# Patient Record
Sex: MALE | Race: WHITE | NOT HISPANIC OR LATINO
[De-identification: names, ages, dates, MRNs, and addresses within clinical notes are randomized per-mention and may not be internally consistent; named-entity substitution may affect disease eponyms.]

---

## 2021-03-11 PROBLEM — Z00.00 ENCOUNTER FOR PREVENTIVE HEALTH EXAMINATION: Status: ACTIVE | Noted: 2021-03-11

## 2021-03-15 ENCOUNTER — NON-APPOINTMENT (OUTPATIENT)
Age: 66
End: 2021-03-15

## 2021-03-15 ENCOUNTER — APPOINTMENT (OUTPATIENT)
Dept: CARDIOLOGY | Facility: CLINIC | Age: 66
End: 2021-03-15
Payer: COMMERCIAL

## 2021-03-15 VITALS
HEIGHT: 72 IN | TEMPERATURE: 97.6 F | WEIGHT: 173 LBS | BODY MASS INDEX: 23.43 KG/M2 | HEART RATE: 73 BPM | DIASTOLIC BLOOD PRESSURE: 78 MMHG | SYSTOLIC BLOOD PRESSURE: 130 MMHG | OXYGEN SATURATION: 100 %

## 2021-03-15 PROCEDURE — 93000 ELECTROCARDIOGRAM COMPLETE: CPT | Mod: 59

## 2021-03-15 PROCEDURE — 99072 ADDL SUPL MATRL&STAF TM PHE: CPT

## 2021-03-15 PROCEDURE — 99203 OFFICE O/P NEW LOW 30 MIN: CPT

## 2021-03-15 PROCEDURE — 93242 EXT ECG>48HR<7D RECORDING: CPT

## 2021-03-15 NOTE — REASON FOR VISIT
[Consultation] : a consultation regarding [FreeTextEntry2] : brief palpitations, chest pain, dyspnea at rest [FreeTextEntry1] : Delma is a 65-year-old male with history of hypertension on lisinopril HCTZ BPH on Tamsulosin, GERD, vertigo, family history of valvular heart disease.\par \par No history of CAD, MI, revascularization, VHD, CHF, TIA, CVA, diabetes, PVD, DVT, PE, arrhythmia, AF.\par \par Patient has recurrent resting brief palpitations, fluttering with associated chest pressure, mild intensity nonradiating nonreproducible.  Palpitations and chest discomfort occur once or twice per week.  Cardiovascular review of symptoms is negative for exertional chest pain, dyspnea, dizziness or syncope.  No PND or orthopnea leg edema.  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 Ward who has retired, last seen 2013 normal Myoview stress test, echocardiogram and Holter monitor at that time. \par \par Labs Oct 2020 normal CBC, BMP, LFT, TSH, HbA1c 5.9 fasting cholesterol 189, triglyceride 50, HDL 64, \par

## 2021-03-15 NOTE — ASSESSMENT
[FreeTextEntry1] : Delma is a 65-year-old male with medical history detailed above and active medical issues including:\par \par - Recurrent palpitations associated chest pressure and dyspnea, abnormal baseline EKG. patient will have noninvasive testing with exercise stress echo to assess for obstructive CAD, HR and BP response, exercise-induced arrhythmia, echocardiogram for LVEF, structural heart disease, carotid and abdominal ultrasound to assess for obstructive PAD.  Zio patch 1 week heart monitor started today. \par \par - Hypertension to new to monitor home BPs to confirm at guideline goal on lisinopril 20 mg and HCTZ 12.5 mg daily\par \par - Family history of valvular heart disease other had 3 AVR surgeries\par \par - BPH on Tamsulosin\par \par Patient will be seen in cardiology follow-up after noninvasive testing.\par

## 2021-03-15 NOTE — PHYSICAL EXAM
[General Appearance - Well Developed] : well developed [Normal Appearance] : normal appearance [Well Groomed] : well groomed [General Appearance - Well Nourished] : well nourished [No Deformities] : no deformities [General Appearance - In No Acute Distress] : no acute distress [Normal Conjunctiva] : the conjunctiva exhibited no abnormalities [Eyelids - No Xanthelasma] : the eyelids demonstrated no xanthelasmas [Normal Oral Mucosa] : normal oral mucosa [No Oral Pallor] : no oral pallor [No Oral Cyanosis] : no oral cyanosis [Normal Jugular Venous A Waves Present] : normal jugular venous A waves present [Normal Jugular Venous V Waves Present] : normal jugular venous V waves present [No Jugular Venous Ramírez A Waves] : no jugular venous ramírez A waves [Heart Rate And Rhythm] : heart rate and rhythm were normal [Heart Sounds] : normal S1 and S2 [Murmurs] : no murmurs present [Respiration, Rhythm And Depth] : normal respiratory rhythm and effort [Exaggerated Use Of Accessory Muscles For Inspiration] : no accessory muscle use [Auscultation Breath Sounds / Voice Sounds] : lungs were clear to auscultation bilaterally [Abdomen Soft] : soft [Abdomen Tenderness] : non-tender [Abdomen Mass (___ Cm)] : no abdominal mass palpated [Abnormal Walk] : normal gait [Gait - Sufficient For Exercise Testing] : the gait was sufficient for exercise testing [Nail Clubbing] : no clubbing of the fingernails [Cyanosis, Localized] : no localized cyanosis [Petechial Hemorrhages (___cm)] : no petechial hemorrhages [Skin Color & Pigmentation] : normal skin color and pigmentation [] : no rash [No Venous Stasis] : no venous stasis [Skin Lesions] : no skin lesions [No Skin Ulcers] : no skin ulcer [No Xanthoma] : no  xanthoma was observed [Oriented To Time, Place, And Person] : oriented to person, place, and time [Affect] : the affect was normal [Mood] : the mood was normal [No Anxiety] : not feeling anxious

## 2021-03-15 NOTE — REVIEW OF SYSTEMS
[Shortness Of Breath] : shortness of breath [Dyspnea on exertion] : dyspnea during exertion [Chest  Pressure] : chest pressure [Palpitations] : palpitations [Negative] : Heme/Lymph

## 2021-03-16 ENCOUNTER — TRANSCRIPTION ENCOUNTER (OUTPATIENT)
Age: 66
End: 2021-03-16

## 2021-03-30 ENCOUNTER — APPOINTMENT (OUTPATIENT)
Dept: CARDIOLOGY | Facility: CLINIC | Age: 66
End: 2021-03-30
Payer: COMMERCIAL

## 2021-03-30 PROCEDURE — 93979 VASCULAR STUDY: CPT

## 2021-03-30 PROCEDURE — 99072 ADDL SUPL MATRL&STAF TM PHE: CPT

## 2021-03-30 PROCEDURE — 93306 TTE W/DOPPLER COMPLETE: CPT

## 2021-03-30 PROCEDURE — 93880 EXTRACRANIAL BILAT STUDY: CPT

## 2021-04-06 PROCEDURE — 99072 ADDL SUPL MATRL&STAF TM PHE: CPT

## 2021-04-06 PROCEDURE — 93244 EXT ECG>48HR<7D REV&INTERPJ: CPT

## 2021-05-05 ENCOUNTER — APPOINTMENT (OUTPATIENT)
Dept: CARDIOLOGY | Facility: CLINIC | Age: 66
End: 2021-05-05
Payer: MEDICARE

## 2021-05-05 PROCEDURE — 93351 STRESS TTE COMPLETE: CPT

## 2021-05-10 ENCOUNTER — APPOINTMENT (OUTPATIENT)
Dept: CARDIOLOGY | Facility: CLINIC | Age: 66
End: 2021-05-10
Payer: MEDICARE

## 2021-05-10 VITALS
RESPIRATION RATE: 16 BRPM | WEIGHT: 169 LBS | HEART RATE: 81 BPM | DIASTOLIC BLOOD PRESSURE: 78 MMHG | HEIGHT: 72 IN | SYSTOLIC BLOOD PRESSURE: 148 MMHG | TEMPERATURE: 98.2 F | BODY MASS INDEX: 22.89 KG/M2 | OXYGEN SATURATION: 98 %

## 2021-05-10 PROCEDURE — 99214 OFFICE O/P EST MOD 30 MIN: CPT

## 2021-05-10 RX ORDER — MV-MIN/FOLIC/VIT K/LYCOP/COQ10 200-100MCG
CAPSULE ORAL
Refills: 0 | Status: ACTIVE | COMMUNITY

## 2021-05-10 RX ORDER — TAMSULOSIN HYDROCHLORIDE 0.4 MG/1
0.4 CAPSULE ORAL
Qty: 90 | Refills: 1 | Status: ACTIVE | COMMUNITY
Start: 2020-12-16

## 2021-05-10 NOTE — REVIEW OF SYSTEMS
[Chest Discomfort] : chest discomfort [Palpitations] : palpitations [Dizziness] : dizziness [Negative] : Heme/Lymph

## 2021-05-25 ENCOUNTER — APPOINTMENT (OUTPATIENT)
Dept: CT IMAGING | Facility: CLINIC | Age: 66
End: 2021-05-25
Payer: MEDICARE

## 2021-05-25 PROCEDURE — 82565A: CUSTOM | Mod: QW

## 2021-05-25 PROCEDURE — 74160 CT ABDOMEN W/CONTRAST: CPT | Mod: MH

## 2021-06-01 ENCOUNTER — APPOINTMENT (OUTPATIENT)
Dept: NUCLEAR MEDICINE | Facility: CLINIC | Age: 66
End: 2021-06-01
Payer: MEDICARE

## 2021-06-01 ENCOUNTER — OUTPATIENT (OUTPATIENT)
Dept: OUTPATIENT SERVICES | Facility: HOSPITAL | Age: 66
LOS: 1 days | End: 2021-06-01

## 2021-06-01 PROCEDURE — 78306 BONE IMAGING WHOLE BODY: CPT | Mod: 26

## 2021-06-01 PROCEDURE — 78830 RP LOCLZJ TUM SPECT W/CT 1: CPT | Mod: 26

## 2021-06-14 ENCOUNTER — APPOINTMENT (OUTPATIENT)
Dept: MRI IMAGING | Facility: CLINIC | Age: 66
End: 2021-06-14
Payer: MEDICARE

## 2021-06-14 PROCEDURE — 72148 MRI LUMBAR SPINE W/O DYE: CPT | Mod: MH

## 2021-06-14 PROCEDURE — A9585: CPT

## 2021-06-15 ENCOUNTER — APPOINTMENT (OUTPATIENT)
Dept: OPHTHALMOLOGY | Facility: CLINIC | Age: 66
End: 2021-06-15

## 2021-09-09 ENCOUNTER — APPOINTMENT (OUTPATIENT)
Dept: OPHTHALMOLOGY | Facility: CLINIC | Age: 66
End: 2021-09-09
Payer: MEDICARE

## 2021-09-09 ENCOUNTER — NON-APPOINTMENT (OUTPATIENT)
Age: 66
End: 2021-09-09

## 2021-09-09 PROCEDURE — 92015 DETERMINE REFRACTIVE STATE: CPT

## 2021-10-14 PROCEDURE — 71045 X-RAY EXAM CHEST 1 VIEW: CPT | Mod: 26

## 2021-10-14 PROCEDURE — 93010 ELECTROCARDIOGRAM REPORT: CPT

## 2021-10-14 PROCEDURE — 99291 CRITICAL CARE FIRST HOUR: CPT

## 2021-10-15 ENCOUNTER — INPATIENT (INPATIENT)
Facility: HOSPITAL | Age: 66
LOS: 2 days | Discharge: HOME | End: 2021-10-18
Payer: COMMERCIAL

## 2021-10-15 PROCEDURE — 70450 CT HEAD/BRAIN W/O DYE: CPT | Mod: 26

## 2021-10-29 ENCOUNTER — APPOINTMENT (OUTPATIENT)
Dept: OPHTHALMOLOGY | Facility: CLINIC | Age: 66
End: 2021-10-29
Payer: MEDICARE

## 2021-10-29 ENCOUNTER — NON-APPOINTMENT (OUTPATIENT)
Age: 66
End: 2021-10-29

## 2021-10-29 PROCEDURE — 92083 EXTENDED VISUAL FIELD XM: CPT

## 2021-10-29 PROCEDURE — 92250 FUNDUS PHOTOGRAPHY W/I&R: CPT

## 2021-11-11 ENCOUNTER — NON-APPOINTMENT (OUTPATIENT)
Age: 66
End: 2021-11-11

## 2021-11-11 ENCOUNTER — APPOINTMENT (OUTPATIENT)
Dept: OPHTHALMOLOGY | Facility: CLINIC | Age: 66
End: 2021-11-11
Payer: MEDICARE

## 2021-11-11 PROCEDURE — 92133 CPTRZD OPH DX IMG PST SGM ON: CPT

## 2021-11-11 PROCEDURE — 92014 COMPRE OPH EXAM EST PT 1/>: CPT

## 2021-11-30 ENCOUNTER — APPOINTMENT (OUTPATIENT)
Dept: CARDIOLOGY | Facility: CLINIC | Age: 66
End: 2021-11-30
Payer: MEDICARE

## 2021-11-30 VITALS — HEART RATE: 64 BPM | SYSTOLIC BLOOD PRESSURE: 122 MMHG | DIASTOLIC BLOOD PRESSURE: 60 MMHG | OXYGEN SATURATION: 100 %

## 2021-11-30 PROCEDURE — 99214 OFFICE O/P EST MOD 30 MIN: CPT

## 2021-11-30 RX ORDER — SIMETHICONE 80 MG
80 TABLET ORAL 4 TIMES DAILY
Refills: 0 | Status: DISCONTINUED | COMMUNITY
End: 2021-11-30

## 2021-12-02 NOTE — PHYSICAL EXAM
[Well Developed] : well developed [Well Nourished] : well nourished [No Acute Distress] : no acute distress [Normal Venous Pressure] : normal venous pressure [No Carotid Bruit] : no carotid bruit [Normal S1, S2] : normal S1, S2 [No Rub] : no rub [No Gallop] : no gallop [Murmur] : murmur [Clear Lung Fields] : clear lung fields [Good Air Entry] : good air entry [No Respiratory Distress] : no respiratory distress  [Soft] : abdomen soft [Non Tender] : non-tender [No Masses/organomegaly] : no masses/organomegaly [Normal Bowel Sounds] : normal bowel sounds [Normal Gait] : normal gait [No Edema] : no edema [No Cyanosis] : no cyanosis [No Clubbing] : no clubbing [No Varicosities] : no varicosities [No Rash] : no rash [No Skin Lesions] : no skin lesions [Moves all extremities] : moves all extremities [No Focal Deficits] : no focal deficits [Normal Speech] : normal speech [Alert and Oriented] : alert and oriented [Normal memory] : normal memory [General Appearance - Well Developed] : well developed [Normal Appearance] : normal appearance [Well Groomed] : well groomed [General Appearance - Well Nourished] : well nourished [No Deformities] : no deformities [General Appearance - In No Acute Distress] : no acute distress [Normal Conjunctiva] : the conjunctiva exhibited no abnormalities [Eyelids - No Xanthelasma] : the eyelids demonstrated no xanthelasmas [Normal Oral Mucosa] : normal oral mucosa [No Oral Pallor] : no oral pallor [No Oral Cyanosis] : no oral cyanosis [Normal Jugular Venous A Waves Present] : normal jugular venous A waves present [Normal Jugular Venous V Waves Present] : normal jugular venous V waves present [No Jugular Venous Ramírez A Waves] : no jugular venous ramírez A waves [Heart Rate And Rhythm] : heart rate and rhythm were normal [Heart Sounds] : normal S1 and S2 [Murmurs] : no murmurs present [Respiration, Rhythm And Depth] : normal respiratory rhythm and effort [Exaggerated Use Of Accessory Muscles For Inspiration] : no accessory muscle use [Auscultation Breath Sounds / Voice Sounds] : lungs were clear to auscultation bilaterally [Abdomen Soft] : soft [Abdomen Tenderness] : non-tender [Abdomen Mass (___ Cm)] : no abdominal mass palpated [Abnormal Walk] : normal gait [Gait - Sufficient For Exercise Testing] : the gait was sufficient for exercise testing [Nail Clubbing] : no clubbing of the fingernails [Cyanosis, Localized] : no localized cyanosis [Petechial Hemorrhages (___cm)] : no petechial hemorrhages [Skin Color & Pigmentation] : normal skin color and pigmentation [] : no rash [No Venous Stasis] : no venous stasis [Skin Lesions] : no skin lesions [No Skin Ulcers] : no skin ulcer [No Xanthoma] : no  xanthoma was observed [Oriented To Time, Place, And Person] : oriented to person, place, and time [Affect] : the affect was normal [Mood] : the mood was normal [No Anxiety] : not feeling anxious [de-identified] : 2/6 [facility] LSB of MR

## 2021-12-02 NOTE — REASON FOR VISIT
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 CTA was ordered and never completed due to scheduling issues. Since last seen he received the Covid booster 10/2021 and experiences vomiting and decreased PO intake for 3 days. Was drinking water. Went to the ER and sodium was found to be 106. BP was elevated. His HCTZ was stopped. BP remained elevated and he was discharged on Amlodipine 5 mg daily and Lisinopril was increased to 20mg BID. Followed up with PCP and renal and then Toprol XL 25mg daily and HCTZ 12.5mg daily was started. Most recent Na was 137 2 weeks ago per patient report. S/he denies chest pain, pressure, palpitations, unusual shortness of breath, orthopnea, LE edema, or syncope. Reports lightheadedness with changes in position that sound vertigo-like in nature. Never a smoker. Attempting to exercise without exertional complaints; likes to ride his bike weather permitting.\par \[facility] Home BP log reviewed. BPs stable. /70 on my exam today.\par \[facility] records requested. Most recent note from renal; Dr. Olivas requested.\par \par Testing:\par \par Exercise stress echo May 2021, ischemic EKG response with normal exercise wall motion, leg fatigue with peak exercise, 89% MPHR, 7 minutes 45 seconds of Jose Roberto protocol, baseline sinus rhythm LAE, IRBBB, NSST\par \par Echocardiogram March 30, 21, Mild to mod MR, minimal AR, Noraml wall motion, mild TR, minimal MI, small pericardial effusion.\par \par Labs 3/15/21: WBC 5.2, Hgb 15, HCT 43.6, plt 149, Cr 1.03, Na 136, K 4.4, Ca 9.1, AST 18, ALT 14, Chol 189, Trigs 50, HDL 64, , A1C 5.9, TSH 1.35, CK 59\par \par Carotid and abdominal ultrasound March 2021, mild nonobstructive plaque, normal abdominal aortic size.\par \par Zio patch 1 week heart monitor March 2021 sinus rhythm average heart rate 62 bpm, brief PSVT, PACs, PVCs, ventricular bigeminy and trigeminy\par \par EKG sinus bradycardia, NAVYA, anterior TWI\par \par Patient followed by cardiologist Dr. Ward who has retired, last seen 2013 normal Myoview stress test, echocardiogram and Holter monitor at that time. \par \par Labs Oct 2020 normal CBC, BMP, LFT, TSH, HbA1c 5.9 fasting cholesterol 189, triglyceride 50, HDL 64, \par

## 2021-12-02 NOTE — ASSESSMENT
[FreeTextEntry1] : MIRIAM AVILA is a 66 year old M who presents today Nov 30, 2021 with the above history and the following active issues:\par \par \par Patient reminded to get CTA. He declines. Also asymptomatic.\par \par HTN: Continue Amlodipine 5mg daily, HCTZ 12.5 mg daily, Lisinopril 20mg BID, and Toprol Xl 25mg daily. Educated patient on low salt diet, alcohol intake in moderation, regular cardiovascular exercise, and weight reduction for improved BP control. Continue to monitor BP at home and call for persistently elevated readings (>160/90). \par \par \par Small pericardial effusion noted on echo 3/2021. Recommend repeating limited echo and OV with Dr. Valentino same day when he gets back from [place].\par Family history of valvular heart disease other had 3 AVR surgeries\par \par BPH on Tamsulosin\par \par Ongoing f/u with PCP.\par \par F/U: Limited echo and OV in 3 months with Dr. Valentino.\par Discussed red flag symptoms, which would warrant sooner or emergent medical evaluation.\par Any questions and concerns were addressed and resolved.\par \par Sincerely,\par Soumya Bravo FNP-BC\par Patient's history, testing, and plan was reviewed with supervising physician, Dr. Xavier Guerrero

## 2021-12-16 ENCOUNTER — NON-APPOINTMENT (OUTPATIENT)
Age: 66
End: 2021-12-16

## 2021-12-23 NOTE — REASON FOR VISIT
[Consultation] : a consultation regarding [FreeTextEntry1] : Delma is a 66-year-old male with history of hypertension on lisinopril HCTZ BPH on Tamsulosin, GERD, vertigo, family history of valvular heart disease.\par \par No history of CAD, MI, revascularization, VHD, CHF, TIA, CVA, diabetes, PVD, DVT, PE, arrhythmia, AF.\par \par Patient has brief palpitations, fluttering with associated chest pressure, mild intensity nonradiating nonreproducible.  Palpitations and chest discomfort occur once or twice per week.  Cardiovascular review of symptoms is negative for exertional chest pain, dyspnea, dizziness or syncope.  No PND or orthopnea leg edema.  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 his partner\par \par Exercise stress echo May 2021, ischemic EKG response with normal exercise wall motion, leg fatigue with peak exercise, 89% MPHR, 7 minutes 45 seconds of Jose Roberto protocol, baseline sinus rhythm LAE, IRBBB, NSST\par \par Echocardiogram March 2021, LVEF 60%, mild to moderate MR, mild TR, normal RVSP.\par \par Carotid and abdominal ultrasound March 2021, mild nonobstructive plaque, normal abdominal aortic size.\par \par Zio patch 1 week heart monitor March 2021 sinus rhythm average heart rate 62 bpm, brief PSVT, PACs, PVCs, ventricular bigeminy and trigeminy\par \par EKG sinus bradycardia, NAVYA, anterior TWI\par \par Patient followed by cardiologist Dr. Ward who has retired, last seen 2013 normal Myoview stress test, echocardiogram and Holter monitor at that time. \par \par Labs Oct 2020 normal CBC, BMP, LFT, TSH, HbA1c 5.9 fasting cholesterol 189, triglyceride 50, HDL 64, \par  [FreeTextEntry2] : noninvasive testing for brief palpitations, chest pain, dyspnea at rest

## 2021-12-23 NOTE — ASSESSMENT
[FreeTextEntry1] : Delma is a 66-year-old male with medical history detailed above and active medical issues including:\par \par - Recurrent palpitations associated chest pressure and dyspnea, abnormal baseline EKG, ischemic EKG response with normal exercise wall motion on stress test May 2021. Patient will have noninvasive testing with coronary CTA to assess for obstructive CAD with TEB followup.  Recommended increased oral hydration with electrolyte suppliment drinks, avoid caffeine and alcohol intake.\par \par - Hypertension to new to monitor home BPs to confirm at guideline goal on lisinopril 20 mg and HCTZ 12.5 mg daily\par \par - Family history of valvular heart disease other had 3 AVR surgeries\par \par - BPH on Tamsulosin, nephrologist Dr. Chaidez\par \par Patient will be seen in cardiology follow-up 1 year at patient request. \par \par Delma will follow up with Dr Herman Mcginnis for primary care\par

## 2021-12-23 NOTE — PHYSICAL EXAM
[General Appearance - Well Developed] : well developed [Normal Appearance] : normal appearance [Well Groomed] : well groomed [General Appearance - Well Nourished] : well nourished [No Deformities] : no deformities [General Appearance - In No Acute Distress] : no acute distress [Eyelids - No Xanthelasma] : the eyelids demonstrated no xanthelasmas [Normal Oral Mucosa] : normal oral mucosa [No Oral Pallor] : no oral pallor [No Oral Cyanosis] : no oral cyanosis [Normal Jugular Venous A Waves Present] : normal jugular venous A waves present [Normal Jugular Venous V Waves Present] : normal jugular venous V waves present [No Jugular Venous Ramírez A Waves] : no jugular venous ramírez A waves [Heart Rate And Rhythm] : heart rate and rhythm were normal [Heart Sounds] : normal S1 and S2 [Murmurs] : no murmurs present [Respiration, Rhythm And Depth] : normal respiratory rhythm and effort [Auscultation Breath Sounds / Voice Sounds] : lungs were clear to auscultation bilaterally [Exaggerated Use Of Accessory Muscles For Inspiration] : no accessory muscle use [Abdomen Soft] : soft [Abdomen Tenderness] : non-tender [Abdomen Mass (___ Cm)] : no abdominal mass palpated [Abnormal Walk] : normal gait [Gait - Sufficient For Exercise Testing] : the gait was sufficient for exercise testing [Nail Clubbing] : no clubbing of the fingernails [Cyanosis, Localized] : no localized cyanosis [Petechial Hemorrhages (___cm)] : no petechial hemorrhages [Skin Color & Pigmentation] : normal skin color and pigmentation [] : no rash [No Venous Stasis] : no venous stasis [Skin Lesions] : no skin lesions [No Skin Ulcers] : no skin ulcer [No Xanthoma] : no  xanthoma was observed [Oriented To Time, Place, And Person] : oriented to person, place, and time [Affect] : the affect was normal [Mood] : the mood was normal [No Anxiety] : not feeling anxious [Well Developed] : well developed [Well Nourished] : well nourished [No Acute Distress] : no acute distress [Normal Conjunctiva] : normal conjunctiva [Normal Venous Pressure] : normal venous pressure [No Carotid Bruit] : no carotid bruit [Normal S1, S2] : normal S1, S2 [No Rub] : no rub [No Gallop] : no gallop [Murmur] : murmur [Clear Lung Fields] : clear lung fields [Good Air Entry] : good air entry [No Respiratory Distress] : no respiratory distress  [Soft] : abdomen soft [Non Tender] : non-tender [No Masses/organomegaly] : no masses/organomegaly [Normal Bowel Sounds] : normal bowel sounds [Normal Gait] : normal gait [No Edema] : no edema [No Cyanosis] : no cyanosis [No Clubbing] : no clubbing [No Varicosities] : no varicosities [No Rash] : no rash [No Skin Lesions] : no skin lesions [Moves all extremities] : moves all extremities [No Focal Deficits] : no focal deficits [Normal Speech] : normal speech [Alert and Oriented] : alert and oriented [Normal memory] : normal memory [de-identified] : 2/6 [facility] LSB of MR

## 2022-01-12 RX ORDER — METOPROLOL SUCCINATE 25 MG/1
25 TABLET, EXTENDED RELEASE ORAL DAILY
Refills: 0 | Status: DISCONTINUED | COMMUNITY
Start: 2021-11-30 | End: 2022-01-12

## 2022-03-21 ENCOUNTER — APPOINTMENT (OUTPATIENT)
Dept: CARDIOLOGY | Facility: CLINIC | Age: 67
End: 2022-03-21
Payer: MEDICARE

## 2022-03-21 PROCEDURE — 93308 TTE F-UP OR LMTD: CPT

## 2022-03-25 ENCOUNTER — NON-APPOINTMENT (OUTPATIENT)
Age: 67
End: 2022-03-25

## 2022-03-25 ENCOUNTER — APPOINTMENT (OUTPATIENT)
Dept: OPHTHALMOLOGY | Facility: CLINIC | Age: 67
End: 2022-03-25
Payer: MEDICARE

## 2022-03-25 PROCEDURE — 92014 COMPRE OPH EXAM EST PT 1/>: CPT

## 2022-04-07 ENCOUNTER — APPOINTMENT (OUTPATIENT)
Dept: CARDIOLOGY | Facility: CLINIC | Age: 67
End: 2022-04-07
Payer: MEDICARE

## 2022-04-07 PROCEDURE — 99214 OFFICE O/P EST MOD 30 MIN: CPT

## 2022-06-08 NOTE — REASON FOR VISIT
[Consultation] : a consultation regarding [Other: ____] : [unfilled] [FreeTextEntry1] : Delma is a 66-year-old male with history of hypertension on lisinopril HCTZ BPH on Tamsulosin, GERD, vertigo, family history of valvular heart disease.\par \par No history of CAD, MI, revascularization, VHD, CHF, TIA, CVA, diabetes, PVD, DVT, PE, arrhythmia, AF.\par \par Cardiovascular review of symptoms is negative for exertional chest pain, dyspnea, palpitations, dizziness or syncope.  No PND or orthopnea leg edema.  No bleeding or black stool.\par \par Patient is exercising riding a bike more than 30 minutes without exertional symptoms other than mild leg fatigue.\par \par Patient is a retired  at [employer] living locally with his partner\par \par Echocardiogram March 2022 LVEF 60%, mild MR, trace pericardial effusion\par \par Exercise stress echo May 2021, ischemic EKG response with normal exercise wall motion, leg fatigue with peak exercise, 89% MPHR, 7 minutes 45 seconds of Jose Roberto protocol, baseline sinus rhythm LAE, IRBBB, NSST\par \par Echocardiogram March 2021, LVEF 60%, mild to moderate MR, mild TR, normal RVSP.\par \par Carotid and abdominal ultrasound March 2021, mild nonobstructive plaque, normal abdominal aortic size.\par \par Zio patch 1 week heart monitor March 2021 sinus rhythm average heart rate 62 bpm, brief PSVT, PACs, PVCs, ventricular bigeminy and trigeminy\par \par EKG sinus bradycardia, NAVYA, anterior TWI\par \par Patient followed by cardiologist Dr. Ward who has retired, last seen 2013 normal Myoview stress test, echocardiogram and Holter monitor at that time. \par \par Labs Oct 2020 normal CBC, BMP, LFT, TSH, HbA1c 5.9 fasting cholesterol 189, triglyceride 50, HDL 64, \par  [FreeTextEntry2] : noninvasive testing for brief palpitations, chest pain, dyspnea at rest

## 2022-06-08 NOTE — PHYSICAL EXAM
[Well Developed] : well developed [Well Nourished] : well nourished [No Acute Distress] : no acute distress [Normal Venous Pressure] : normal venous pressure [No Carotid Bruit] : no carotid bruit [Normal S1, S2] : normal S1, S2 [No Rub] : no rub [No Gallop] : no gallop [Murmur] : murmur [Clear Lung Fields] : clear lung fields [Good Air Entry] : good air entry [No Respiratory Distress] : no respiratory distress  [Soft] : abdomen soft [Non Tender] : non-tender [No Masses/organomegaly] : no masses/organomegaly [Normal Bowel Sounds] : normal bowel sounds [Normal Gait] : normal gait [No Edema] : no edema [No Cyanosis] : no cyanosis [No Clubbing] : no clubbing [No Varicosities] : no varicosities [No Rash] : no rash [No Skin Lesions] : no skin lesions [Moves all extremities] : moves all extremities [No Focal Deficits] : no focal deficits [Normal Speech] : normal speech [Alert and Oriented] : alert and oriented [Normal memory] : normal memory [General Appearance - Well Developed] : well developed [Normal Appearance] : normal appearance [Well Groomed] : well groomed [General Appearance - Well Nourished] : well nourished [No Deformities] : no deformities [General Appearance - In No Acute Distress] : no acute distress [Normal Conjunctiva] : the conjunctiva exhibited no abnormalities [Eyelids - No Xanthelasma] : the eyelids demonstrated no xanthelasmas [Normal Oral Mucosa] : normal oral mucosa [No Oral Pallor] : no oral pallor [No Oral Cyanosis] : no oral cyanosis [Normal Jugular Venous A Waves Present] : normal jugular venous A waves present [Normal Jugular Venous V Waves Present] : normal jugular venous V waves present [No Jugular Venous Ramírez A Waves] : no jugular venous ramírez A waves [Heart Rate And Rhythm] : heart rate and rhythm were normal [Heart Sounds] : normal S1 and S2 [Murmurs] : no murmurs present [Respiration, Rhythm And Depth] : normal respiratory rhythm and effort [Exaggerated Use Of Accessory Muscles For Inspiration] : no accessory muscle use [Auscultation Breath Sounds / Voice Sounds] : lungs were clear to auscultation bilaterally [Abdomen Soft] : soft [Abdomen Tenderness] : non-tender [Abdomen Mass (___ Cm)] : no abdominal mass palpated [Abnormal Walk] : normal gait [Gait - Sufficient For Exercise Testing] : the gait was sufficient for exercise testing [Nail Clubbing] : no clubbing of the fingernails [Cyanosis, Localized] : no localized cyanosis [Petechial Hemorrhages (___cm)] : no petechial hemorrhages [Skin Color & Pigmentation] : normal skin color and pigmentation [] : no rash [No Venous Stasis] : no venous stasis [Skin Lesions] : no skin lesions [No Skin Ulcers] : no skin ulcer [No Xanthoma] : no  xanthoma was observed [Oriented To Time, Place, And Person] : oriented to person, place, and time [Affect] : the affect was normal [Mood] : the mood was normal [No Anxiety] : not feeling anxious [No Murmur] : no murmur [de-identified] : 2/6 KHOI LSB of MR

## 2022-06-13 ENCOUNTER — APPOINTMENT (OUTPATIENT)
Dept: CARDIOLOGY | Facility: CLINIC | Age: 67
End: 2022-06-13

## 2022-06-13 ENCOUNTER — APPOINTMENT (OUTPATIENT)
Dept: CT IMAGING | Facility: CLINIC | Age: 67
End: 2022-06-13

## 2022-09-19 ENCOUNTER — APPOINTMENT (OUTPATIENT)
Dept: CARDIOLOGY | Facility: CLINIC | Age: 67
End: 2022-09-19

## 2022-09-19 VITALS
SYSTOLIC BLOOD PRESSURE: 120 MMHG | HEIGHT: 72 IN | TEMPERATURE: 97.3 F | OXYGEN SATURATION: 97 % | WEIGHT: 170 LBS | DIASTOLIC BLOOD PRESSURE: 70 MMHG | HEART RATE: 67 BPM | BODY MASS INDEX: 23.03 KG/M2

## 2022-09-19 PROCEDURE — 99215 OFFICE O/P EST HI 40 MIN: CPT

## 2022-09-19 RX ORDER — AMLODIPINE BESYLATE 5 MG/1
5 TABLET ORAL DAILY
Refills: 0 | Status: DISCONTINUED | COMMUNITY
Start: 2021-11-30 | End: 2022-09-19

## 2022-09-19 NOTE — PHYSICAL EXAM
[Well Developed] : well developed [Well Nourished] : well nourished [No Acute Distress] : no acute distress [Normal Venous Pressure] : normal venous pressure [No Carotid Bruit] : no carotid bruit [Normal S1, S2] : normal S1, S2 [No Murmur] : no murmur [No Rub] : no rub [No Gallop] : no gallop [Murmur] : murmur [Clear Lung Fields] : clear lung fields [Good Air Entry] : good air entry [No Respiratory Distress] : no respiratory distress  [Soft] : abdomen soft [Non Tender] : non-tender [No Masses/organomegaly] : no masses/organomegaly [Normal Bowel Sounds] : normal bowel sounds [Normal Gait] : normal gait [No Edema] : no edema [No Cyanosis] : no cyanosis [No Clubbing] : no clubbing [No Varicosities] : no varicosities [No Rash] : no rash [No Skin Lesions] : no skin lesions [Moves all extremities] : moves all extremities [No Focal Deficits] : no focal deficits [Normal Speech] : normal speech [Alert and Oriented] : alert and oriented [Normal memory] : normal memory [General Appearance - Well Developed] : well developed [Normal Appearance] : normal appearance [Well Groomed] : well groomed [General Appearance - Well Nourished] : well nourished [No Deformities] : no deformities [General Appearance - In No Acute Distress] : no acute distress [Normal Conjunctiva] : the conjunctiva exhibited no abnormalities [Eyelids - No Xanthelasma] : the eyelids demonstrated no xanthelasmas [Normal Oral Mucosa] : normal oral mucosa [No Oral Pallor] : no oral pallor [No Oral Cyanosis] : no oral cyanosis [Normal Jugular Venous A Waves Present] : normal jugular venous A waves present [Normal Jugular Venous V Waves Present] : normal jugular venous V waves present [No Jugular Venous Ramírez A Waves] : no jugular venous ramírez A waves [Heart Rate And Rhythm] : heart rate and rhythm were normal [Heart Sounds] : normal S1 and S2 [Murmurs] : no murmurs present [Respiration, Rhythm And Depth] : normal respiratory rhythm and effort [Exaggerated Use Of Accessory Muscles For Inspiration] : no accessory muscle use [Auscultation Breath Sounds / Voice Sounds] : lungs were clear to auscultation bilaterally [Abdomen Soft] : soft [Abdomen Tenderness] : non-tender [Abdomen Mass (___ Cm)] : no abdominal mass palpated [Abnormal Walk] : normal gait [Gait - Sufficient For Exercise Testing] : the gait was sufficient for exercise testing [Nail Clubbing] : no clubbing of the fingernails [Cyanosis, Localized] : no localized cyanosis [Petechial Hemorrhages (___cm)] : no petechial hemorrhages [Skin Color & Pigmentation] : normal skin color and pigmentation [] : no rash [No Venous Stasis] : no venous stasis [Skin Lesions] : no skin lesions [No Skin Ulcers] : no skin ulcer [No Xanthoma] : no  xanthoma was observed [Oriented To Time, Place, And Person] : oriented to person, place, and time [Affect] : the affect was normal [Mood] : the mood was normal [No Anxiety] : not feeling anxious [de-identified] : 2/6 [facility] LSB of MR

## 2022-09-19 NOTE — REASON FOR VISIT
[Other: ____] : [unfilled] [Consultation] : a consultation regarding [FreeTextEntry1] : Delma is a 67-year-old male with history of hypertension on lisinopril HCTZ, amlidipine, BPH on Tamsulosin, GERD, vertigo, family history of valvular heart disease.\par \par No history of CAD, MI, revascularization, VHD, CHF, TIA, CVA, diabetes, PVD, DVT, PE, arrhythmia, AF.\par \par Cardiovascular review of symptoms is negative for exertional chest pain, dyspnea, palpitations, dizziness or syncope.  No PND or orthopnea leg edema.  No bleeding or black stool.\par \par Patient is exercising riding a bike more than 30 minutes without exertional symptoms other than mild leg fatigue. Average resting home BPs at guideline goal with average resting heart rates in the 50s. \par \par Patient is a retired  at [employer] living locally with his partner\par \par Echocardiogram March 2022 LVEF 60%, mild MR, trace pericardial effusion\par \par Exercise stress echo May 2021, ischemic EKG response with normal exercise wall motion, leg fatigue with peak exercise, 89% MPHR, 7 minutes 45 seconds of Jose Roberto protocol, baseline sinus rhythm LAE, IRBBB, NSST\par \par Echocardiogram March 2021, LVEF 60%, mild to moderate MR, mild TR, normal RVSP.\par \par Carotid and abdominal ultrasound March 2021, mild nonobstructive plaque, normal abdominal aortic size.\par \par Zio patch 1 week heart monitor March 2021 sinus rhythm average heart rate 62 bpm, brief PSVT, PACs, PVCs, ventricular bigeminy and trigeminy\par \par EKG sinus bradycardia, NAVYA, anterior TWI\par \par Patient followed by cardiologist Dr. Ward who has retired, last seen 2013 normal Myoview stress test, echocardiogram and Holter monitor at that time. \par \par Labs Oct 2020 normal CBC, BMP, LFT, TSH, HbA1c 5.9 fasting cholesterol 189, triglyceride 50, HDL 64, \par  [FreeTextEntry2] : noninvasive testing for brief palpitations, chest pain, dyspnea at rest

## 2022-09-19 NOTE — ASSESSMENT
[facility]    8/27/2020 9:08 AM    Max heart rate: 85%    74 year old    Wt Readings from Last 1 Encounters:   05/05/20 64.9 kg      Ht Readings from Last 1 Encounters:   02/07/20 5' 9\" (1.753 m)       Patient Type: Outpatient    Cardiac Markers: Not Applicable    Reason for test: H/O PVD, ?CAD, HTN    Primary MD: GLEN     Ordering MD: MARY       Cardiologist Performing Test:GAL    --------------------------------------------------------------------------------------------------------------------  HISTORY OF: Asthma / Lung Problems, PVD and HTN       PATIENT HAS HAD THE FOLLOWING IN THE LAST 24 HOURS:  NONE.    Current Outpatient Medications   Medication Sig Dispense Refill   • tenofovir (VIREAD) 300 MG tablet Take 1 tablet by mouth daily. 30 tablet 6   • abacavir (ZIAGEN) 300 MG tablet Take 1 tablet by mouth 2 times daily. 60 tablet 6   • efavirenz (Sustiva) 600 MG tablet Take 1 tablet by mouth at bedtime. 30 tablet 6   • rabeprazole (ACIPHEX) 20 MG tablet Take 1 tablet by mouth 2 times daily (before meals). 60 tablet 5   • dronabinol (MARINOL) 5 MG capsule Take 1 capsule by mouth 2 times daily (before meals). 30 capsule 0   • amLODIPine (NORVASC) 5 MG tablet Take 1 tablet by mouth daily. 90 tablet 3   • Amino Acids (AMINO ACID PO) Indications: Branch chain     • atorvastatin (LIPITOR) 40 MG tablet Take 1 tablet by mouth daily. 90 tablet 3   • cilostazol (PLETAL) 100 MG tablet Take 1 tablet by mouth 2 times daily. 180 tablet 3   • Potassium 99 MG Tab      • MISC NATURAL PRODUCT OP Indications: Jordan plus 50     • IRON PO      • Psyllium (METAMUCIL FIBER PO)      • clopidogrel (PLAVIX) 75 MG tablet Take 1 tablet by mouth daily. 90 tablet 3   • metoCLOPramide (REGLAN) 5 MG tablet Take 1-2 tablets by mouth 4 times daily as needed for nausea. 40 tablet 0   • Calcium Polycarbophil (FIBER-CAPS PO) Take 1 capsule by mouth daily as needed.     • Protein Powder 3 days a week. Indications: A serious mass and wheat      •  [FreeTextEntry1] : Delma is a 67-year-old male with medical history detailed above and active medical issues including:\par \par - No anginal symptoms, normal exercise stress echo with normal LVEF May 2021\par \par - Hypertension, prior bradycardia improved off Toprol, average resting home BPs at guideline goal on lisinopril 20 Mg twice daily, CTZ 12.5 Mg daily, amlodipine 5 Mg daily, lisinopril.  Lisinopril increased to 40 Mg twice daily, continue HCTZ 12.5 Mg daily, amlodipine will be discontinued with follow-up home BPs.  Repeat labs ordered\par \par - Family history of valvular heart disease other had 3 AVR surgeries\par \par - BPH on Tamsulosin, nephrologist Dr. Chaidez\par \par Patient will be seen in cardiology follow-up 1 year same day echocardiogram\par \par Delma will follow up with Dr Herman Mcginnis for primary care. albuterol 108 (90 Base) MCG/ACT inhaler Inhale 2 puffs into the lungs every 4 hours as needed for Shortness of Breath or Wheezing. 18 g 5   • Coenzyme Q10 (COQ10) 50 MG Cap Take 1 capsule by mouth 2 times daily.      • Multiple Vitamins-Minerals (ULTRA JENNIFER GOLD PO) Take 1 tablet by mouth 2 times daily. Patient takes jennifer ultra gold vitamin     • Omega-3 Fatty Acids (FISH OIL PO) Take 2 capsules by mouth daily. Soft gel tablet      • B Complex Vitamins (VITAMIN B COMPLEX) tablet Take 1 tablet by mouth daily.     • ammonium lactate (LAC-HYDRIN) 12 % lotion Apply topically as needed for Dry Skin.     • Ascorbic Acid (VITAMIN C) 1000 MG tablet Take 1,000 mg by mouth 2 times daily.        Current Facility-Administered Medications   Medication Dose Route Frequency Provider Last Rate Last Dose   • regadenoson (LEXISCAN) injection 0.4 mg  0.4 mg Intravenous Once Edil Smith MD         Facility-Administered Medications Ordered in Other Encounters   Medication Dose Route Frequency Provider Last Rate Last Dose   • iopamidol (ISOVUE-300) 61 % injection    PRN Alvin GOGO Maharaj MD   100 mL at 04/22/16 1000       ALLERGIES:   Allergen Reactions   • Aspirin GI UPSET     GI upset     • Morphine Other (See Comments)     Makes him \"hyper\"   • Oxybutynin ANAPHYLAXIS   • Pancreatic Enzymes NAUSEA   • Ramipril GI UPSET       MEETS CRITERIA FOR STRESS TEST: Yes             TYPE OF TEST: Myocardial Perfusion and Lexiscan    ABLE TO WALK: No. Unable to walk reason: PVD     NEEDED: No

## 2022-09-28 ENCOUNTER — APPOINTMENT (OUTPATIENT)
Dept: OPHTHALMOLOGY | Facility: CLINIC | Age: 67
End: 2022-09-28

## 2022-09-28 ENCOUNTER — NON-APPOINTMENT (OUTPATIENT)
Age: 67
End: 2022-09-28

## 2022-09-28 PROCEDURE — ZZZZZ: CPT

## 2022-09-28 PROCEDURE — 92083 EXTENDED VISUAL FIELD XM: CPT

## 2022-09-28 PROCEDURE — 92014 COMPRE OPH EXAM EST PT 1/>: CPT

## 2022-09-28 PROCEDURE — 92133 CPTRZD OPH DX IMG PST SGM ON: CPT

## 2022-11-22 ENCOUNTER — APPOINTMENT (OUTPATIENT)
Dept: RADIOLOGY | Facility: CLINIC | Age: 67
End: 2022-11-22

## 2022-11-22 PROCEDURE — 71046 X-RAY EXAM CHEST 2 VIEWS: CPT

## 2022-11-28 ENCOUNTER — APPOINTMENT (OUTPATIENT)
Dept: CARDIOLOGY | Facility: CLINIC | Age: 67
End: 2022-11-28

## 2022-11-28 VITALS
SYSTOLIC BLOOD PRESSURE: 122 MMHG | TEMPERATURE: 97.1 F | WEIGHT: 169 LBS | BODY MASS INDEX: 22.89 KG/M2 | HEART RATE: 65 BPM | DIASTOLIC BLOOD PRESSURE: 68 MMHG | HEIGHT: 72 IN

## 2022-11-28 PROCEDURE — 99215 OFFICE O/P EST HI 40 MIN: CPT

## 2022-11-28 RX ORDER — CEFDINIR 300 MG/1
300 CAPSULE ORAL
Qty: 14 | Refills: 0 | Status: COMPLETED | COMMUNITY
Start: 2022-06-08 | End: 2022-11-28

## 2022-11-28 NOTE — PHYSICAL EXAM
[Well Developed] : well developed [Well Nourished] : well nourished [No Acute Distress] : no acute distress [Normal Venous Pressure] : normal venous pressure [No Carotid Bruit] : no carotid bruit [Normal S1, S2] : normal S1, S2 [No Murmur] : no murmur [No Rub] : no rub [No Gallop] : no gallop [Murmur] : murmur [Clear Lung Fields] : clear lung fields [Good Air Entry] : good air entry [No Respiratory Distress] : no respiratory distress  [Soft] : abdomen soft [Non Tender] : non-tender [No Masses/organomegaly] : no masses/organomegaly [Normal Bowel Sounds] : normal bowel sounds [Normal Gait] : normal gait [No Edema] : no edema [No Cyanosis] : no cyanosis [No Clubbing] : no clubbing [No Varicosities] : no varicosities [No Rash] : no rash [No Skin Lesions] : no skin lesions [Moves all extremities] : moves all extremities [No Focal Deficits] : no focal deficits [Normal Speech] : normal speech [Alert and Oriented] : alert and oriented [Normal memory] : normal memory [General Appearance - Well Developed] : well developed [Normal Appearance] : normal appearance [Well Groomed] : well groomed [General Appearance - Well Nourished] : well nourished [No Deformities] : no deformities [General Appearance - In No Acute Distress] : no acute distress [Normal Conjunctiva] : the conjunctiva exhibited no abnormalities [Eyelids - No Xanthelasma] : the eyelids demonstrated no xanthelasmas [Normal Oral Mucosa] : normal oral mucosa [No Oral Pallor] : no oral pallor [No Oral Cyanosis] : no oral cyanosis [Normal Jugular Venous A Waves Present] : normal jugular venous A waves present [Normal Jugular Venous V Waves Present] : normal jugular venous V waves present [No Jugular Venous Ramírez A Waves] : no jugular venous ramírez A waves [Heart Rate And Rhythm] : heart rate and rhythm were normal [Heart Sounds] : normal S1 and S2 [Murmurs] : no murmurs present [Respiration, Rhythm And Depth] : normal respiratory rhythm and effort [Exaggerated Use Of Accessory Muscles For Inspiration] : no accessory muscle use [Auscultation Breath Sounds / Voice Sounds] : lungs were clear to auscultation bilaterally [Abdomen Soft] : soft [Abdomen Tenderness] : non-tender [Abdomen Mass (___ Cm)] : no abdominal mass palpated [Abnormal Walk] : normal gait [Gait - Sufficient For Exercise Testing] : the gait was sufficient for exercise testing [Nail Clubbing] : no clubbing of the fingernails [Cyanosis, Localized] : no localized cyanosis [Petechial Hemorrhages (___cm)] : no petechial hemorrhages [Skin Color & Pigmentation] : normal skin color and pigmentation [] : no rash [No Venous Stasis] : no venous stasis [Skin Lesions] : no skin lesions [No Skin Ulcers] : no skin ulcer [No Xanthoma] : no  xanthoma was observed [Oriented To Time, Place, And Person] : oriented to person, place, and time [Affect] : the affect was normal [Mood] : the mood was normal [No Anxiety] : not feeling anxious [de-identified] : 2/6 [facility] LSB of MR

## 2022-11-28 NOTE — REASON FOR VISIT
[Other: ____] : [unfilled] [Consultation] : a consultation regarding [FreeTextEntry1] : Delma is a 67-year-old male with history of hypertension on lisinopril HCTZ, amlidipine, BPH on Tamsulosin, GERD, vertigo, family history of valvular heart disease.\par \par No history of CAD, MI, revascularization, VHD, CHF, TIA, CVA, diabetes, PVD, DVT, PE, arrhythmia, AF.\par \par Cardiovascular review of symptoms is negative for exertional chest pain, dyspnea, palpitations, dizziness or syncope.  No PND or orthopnea leg edema.  No bleeding or black stool.\par \par Patient is exercising riding a bike more than 30 minutes without exertional symptoms other than mild leg fatigue. Average resting home BPs at guideline goal with average resting heart rates in the 50s. \par \par Patient is a retired  at [employer] living locally with his partner. Currently working as local play . \par \par Echocardiogram March 2022 LVEF 60%, mild MR, trace pericardial effusion\par \par Exercise stress echo May 2021, ischemic EKG response with normal exercise wall motion, leg fatigue with peak exercise, 89% MPHR, 7 minutes 45 seconds of Jose Roberto protocol, baseline sinus rhythm LAE, IRBBB, NSST\par \par Echocardiogram March 2021, LVEF 60%, mild to moderate MR, mild TR, normal RVSP.\par \par Carotid and abdominal ultrasound March 2021, mild nonobstructive plaque, normal abdominal aortic size.\par \par Zio patch 1 week heart monitor March 2021 sinus rhythm average heart rate 62 bpm, brief PSVT, PACs, PVCs, ventricular bigeminy and trigeminy\par \par EKG sinus bradycardia, NAVYA, anterior TWI\par \par Patient followed by cardiologist Dr. Ward who has retired, last seen 2013 normal Myoview stress test, echocardiogram and Holter monitor at that time. \par \par Labs Oct 2020 normal CBC, BMP, LFT, TSH, HbA1c 5.9 fasting cholesterol 189, triglyceride 50, HDL 64, \par  [FreeTextEntry2] : noninvasive testing for brief palpitations, chest pain, dyspnea at rest

## 2022-11-28 NOTE — ASSESSMENT
[FreeTextEntry1] : Delma is a 67-year-old male with medical history detailed above and active medical issues including:\par \par - No anginal symptoms, normal exercise stress echo with normal LVEF May 2021\par \par - Hypertension, prior bradycardia improved off Toprol, average resting home BPs at guideline goal on lisinopril 40 Mg twice daily, HCTZ 12.5 Mg daily with follow-up home BPs.  Repeat labs ordered\par \par - Family history of valvular heart disease other had 3 AVR surgeries\par \par - BPH on Tamsulosin, nephrologist Dr. Chaidez\par \par Patient will be seen in cardiology follow-up 6 months with echo and doppler studies. \par \par Delma will follow up with Dr Herman Mcginnis for primary care.

## 2023-03-22 ENCOUNTER — APPOINTMENT (OUTPATIENT)
Dept: CARDIOLOGY | Facility: CLINIC | Age: 68
End: 2023-03-22
Payer: MEDICARE

## 2023-03-22 PROBLEM — R42 VERTIGO: Status: ACTIVE | Noted: 2021-03-15

## 2023-03-22 PROCEDURE — 93880 EXTRACRANIAL BILAT STUDY: CPT

## 2023-03-22 PROCEDURE — 93979 VASCULAR STUDY: CPT

## 2023-03-22 PROCEDURE — 93306 TTE W/DOPPLER COMPLETE: CPT

## 2023-03-22 PROCEDURE — 76376 3D RENDER W/INTRP POSTPROCES: CPT

## 2023-03-29 ENCOUNTER — APPOINTMENT (OUTPATIENT)
Dept: CARDIOLOGY | Facility: CLINIC | Age: 68
End: 2023-03-29
Payer: MEDICARE

## 2023-03-29 VITALS
SYSTOLIC BLOOD PRESSURE: 130 MMHG | DIASTOLIC BLOOD PRESSURE: 60 MMHG | HEART RATE: 64 BPM | HEIGHT: 72 IN | WEIGHT: 173 LBS | OXYGEN SATURATION: 98 % | BODY MASS INDEX: 23.43 KG/M2

## 2023-03-29 DIAGNOSIS — R42 DIZZINESS AND GIDDINESS: ICD-10-CM

## 2023-03-29 PROCEDURE — 99215 OFFICE O/P EST HI 40 MIN: CPT

## 2023-03-29 NOTE — REASON FOR VISIT
[Other: ____] : [unfilled] [Consultation] : a consultation regarding [FreeTextEntry1] : Delma is a 67-year-old male with history of hypertension on lisinopril HCTZ, amlidipine, BPH on Tamsulosin, GERD, vertigo, family history of valvular heart disease.\par \par No history of CAD, MI, revascularization, VHD, CHF, TIA, CVA, diabetes, PVD, DVT, PE, arrhythmia, AF.\par \par Patient has dyspnea with moderate exertion. Cardiovascular review of symptoms is negative for exertional chest pain, palpitations, dizziness or syncope.  No PND or orthopnea leg edema.  No bleeding or black stool.\par \par No exercise routine.  Patient is walking 10 minutes on occasion.\par \par Patient is a retired  at [employer] living locally with his partner. Currently working as local play .\par \par Echocardiogram March 2023 LVEF 60%, mild MR and TR, normal RVSP.\par \par Carotid and abdominal ultrasound March 2023, mild nonobstructive plaque, normal abdominal aortic size.  \par \par Echocardiogram March 2022 LVEF 60%, mild MR, trace pericardial effusion\par \par Exercise stress echo May 2021, ischemic EKG response with normal exercise wall motion, leg fatigue with peak exercise, 89% MPHR, 7 minutes 45 seconds of Jose Roberto protocol, baseline sinus rhythm LAE, IRBBB, NSST\par \par Echocardiogram March 2021, LVEF 60%, mild to moderate MR, mild TR, normal RVSP.\par \par Carotid and abdominal ultrasound March 2021, mild nonobstructive plaque, normal abdominal aortic size.\par \par Zio patch 1 week heart monitor March 2021 sinus rhythm average heart rate 62 bpm, brief PSVT, PACs, PVCs, ventricular bigeminy and trigeminy\par \par EKG sinus bradycardia, NAVYA, anterior TWI\par \par Patient followed by cardiologist Dr. Ward who has retired, last seen 2013 normal Myoview stress test, echocardiogram and Holter monitor at that time. \par \par Labs Oct 2020 normal CBC, BMP, LFT, TSH, HbA1c 5.9 fasting cholesterol 189, triglyceride 50, HDL 64, \par  [FreeTextEntry2] : noninvasive testing for brief palpitations, chest pain, dyspnea at rest

## 2023-03-29 NOTE — ASSESSMENT
[FreeTextEntry1] : Delma is a 67-year-old male with medical history detailed above and active medical issues including:\par \par - Dyspnea on exertion, multiple CAD risk factors.  Coronary CTA and coronary calcium score ordered to assess for obstructive CAD and risk stratification.  Patient wishes to have noninvasive testing done in September 2023\par \par - Hypertension, prior bradycardia improved off Toprol, average resting home BPs at guideline goal on lisinopril 40 Mg twice daily, HCTZ 12.5 Mg daily with follow-up home BPs.  Repeat labs ordered\par \par - Family history of valvular heart disease other had 3 AVR surgeries\par \par - BPH on Tamsulosin, nephrologist Dr. Chaidez\par \par Patient will be seen in cardiology follow-up after noninvasive testing\par \par Delma will follow up with Dr Herman Mcginnis for primary care.

## 2023-03-29 NOTE — PHYSICAL EXAM
[Well Developed] : well developed [Well Nourished] : well nourished [No Acute Distress] : no acute distress [Normal Venous Pressure] : normal venous pressure [No Carotid Bruit] : no carotid bruit [Normal S1, S2] : normal S1, S2 [No Murmur] : no murmur [No Rub] : no rub [No Gallop] : no gallop [Murmur] : murmur [Clear Lung Fields] : clear lung fields [Good Air Entry] : good air entry [No Respiratory Distress] : no respiratory distress  [Soft] : abdomen soft [Non Tender] : non-tender [No Masses/organomegaly] : no masses/organomegaly [Normal Bowel Sounds] : normal bowel sounds [Normal Gait] : normal gait [No Edema] : no edema [No Cyanosis] : no cyanosis [No Clubbing] : no clubbing [No Varicosities] : no varicosities [No Rash] : no rash [No Skin Lesions] : no skin lesions [Moves all extremities] : moves all extremities [No Focal Deficits] : no focal deficits [Normal Speech] : normal speech [Alert and Oriented] : alert and oriented [Normal memory] : normal memory [General Appearance - Well Developed] : well developed [Normal Appearance] : normal appearance [Well Groomed] : well groomed [No Deformities] : no deformities [General Appearance - Well Nourished] : well nourished [General Appearance - In No Acute Distress] : no acute distress [Normal Conjunctiva] : the conjunctiva exhibited no abnormalities [Eyelids - No Xanthelasma] : the eyelids demonstrated no xanthelasmas [Normal Oral Mucosa] : normal oral mucosa [No Oral Pallor] : no oral pallor [No Oral Cyanosis] : no oral cyanosis [Normal Jugular Venous A Waves Present] : normal jugular venous A waves present [Normal Jugular Venous V Waves Present] : normal jugular venous V waves present [No Jugular Venous Ramírez A Waves] : no jugular venous ramírez A waves [Heart Rate And Rhythm] : heart rate and rhythm were normal [Heart Sounds] : normal S1 and S2 [Murmurs] : no murmurs present [Respiration, Rhythm And Depth] : normal respiratory rhythm and effort [Exaggerated Use Of Accessory Muscles For Inspiration] : no accessory muscle use [Auscultation Breath Sounds / Voice Sounds] : lungs were clear to auscultation bilaterally [Abdomen Soft] : soft [Abdomen Tenderness] : non-tender [Abdomen Mass (___ Cm)] : no abdominal mass palpated [Gait - Sufficient For Exercise Testing] : the gait was sufficient for exercise testing [Abnormal Walk] : normal gait [Nail Clubbing] : no clubbing of the fingernails [Cyanosis, Localized] : no localized cyanosis [Petechial Hemorrhages (___cm)] : no petechial hemorrhages [Skin Color & Pigmentation] : normal skin color and pigmentation [] : no rash [No Venous Stasis] : no venous stasis [Skin Lesions] : no skin lesions [No Skin Ulcers] : no skin ulcer [No Xanthoma] : no  xanthoma was observed [Affect] : the affect was normal [Oriented To Time, Place, And Person] : oriented to person, place, and time [Mood] : the mood was normal [No Anxiety] : not feeling anxious [de-identified] : 2/6 K[facility] LSB of MR

## 2023-09-29 ENCOUNTER — NON-APPOINTMENT (OUTPATIENT)
Age: 68
End: 2023-09-29

## 2023-09-29 ENCOUNTER — APPOINTMENT (OUTPATIENT)
Dept: CARDIOLOGY | Facility: CLINIC | Age: 68
End: 2023-09-29
Payer: MEDICARE

## 2023-09-29 VITALS
OXYGEN SATURATION: 98 % | WEIGHT: 169 LBS | SYSTOLIC BLOOD PRESSURE: 138 MMHG | HEIGHT: 72 IN | DIASTOLIC BLOOD PRESSURE: 66 MMHG | HEART RATE: 89 BPM | BODY MASS INDEX: 22.89 KG/M2

## 2023-09-29 PROCEDURE — 93000 ELECTROCARDIOGRAM COMPLETE: CPT

## 2023-09-29 PROCEDURE — 99214 OFFICE O/P EST MOD 30 MIN: CPT

## 2023-10-11 ENCOUNTER — APPOINTMENT (OUTPATIENT)
Dept: CARDIOLOGY | Facility: CLINIC | Age: 68
End: 2023-10-11
Payer: MEDICARE

## 2023-10-17 RX ORDER — HYDROCHLOROTHIAZIDE 12.5 MG/1
12.5 CAPSULE ORAL
Qty: 90 | Refills: 2 | Status: DISCONTINUED | COMMUNITY
Start: 2020-10-20 | End: 2023-10-17

## 2023-10-31 ENCOUNTER — APPOINTMENT (OUTPATIENT)
Dept: CARDIOLOGY | Facility: CLINIC | Age: 68
End: 2023-10-31
Payer: MEDICARE

## 2023-10-31 VITALS
WEIGHT: 175 LBS | OXYGEN SATURATION: 98 % | SYSTOLIC BLOOD PRESSURE: 142 MMHG | HEART RATE: 53 BPM | BODY MASS INDEX: 23.7 KG/M2 | DIASTOLIC BLOOD PRESSURE: 60 MMHG | HEIGHT: 72 IN

## 2023-10-31 PROCEDURE — 99215 OFFICE O/P EST HI 40 MIN: CPT

## 2023-11-06 RX ORDER — LISINOPRIL 40 MG/1
40 TABLET ORAL TWICE DAILY
Qty: 180 | Refills: 1 | Status: ACTIVE | COMMUNITY
Start: 2020-10-20 | End: 1900-01-01

## 2023-11-09 ENCOUNTER — APPOINTMENT (OUTPATIENT)
Dept: CARDIOLOGY | Facility: CLINIC | Age: 68
End: 2023-11-09
Payer: MEDICARE

## 2023-11-09 PROCEDURE — 93306 TTE W/DOPPLER COMPLETE: CPT

## 2023-11-15 ENCOUNTER — APPOINTMENT (OUTPATIENT)
Dept: ELECTROPHYSIOLOGY | Facility: CLINIC | Age: 68
End: 2023-11-15
Payer: MEDICARE

## 2023-11-15 VITALS
BODY MASS INDEX: 23.57 KG/M2 | DIASTOLIC BLOOD PRESSURE: 70 MMHG | SYSTOLIC BLOOD PRESSURE: 148 MMHG | OXYGEN SATURATION: 99 % | WEIGHT: 174 LBS | HEIGHT: 72 IN | HEART RATE: 56 BPM

## 2023-11-15 DIAGNOSIS — Z00.00 ENCOUNTER FOR GENERAL ADULT MEDICAL EXAMINATION W/OUT ABNORMAL FINDINGS: ICD-10-CM

## 2023-11-15 PROCEDURE — 99214 OFFICE O/P EST MOD 30 MIN: CPT

## 2023-11-20 ENCOUNTER — RESULT REVIEW (OUTPATIENT)
Age: 68
End: 2023-11-20

## 2023-11-22 ENCOUNTER — APPOINTMENT (OUTPATIENT)
Dept: OPHTHALMOLOGY | Facility: CLINIC | Age: 68
End: 2023-11-22
Payer: MEDICARE

## 2023-11-22 ENCOUNTER — NON-APPOINTMENT (OUTPATIENT)
Age: 68
End: 2023-11-22

## 2023-11-22 PROCEDURE — 92014 COMPRE OPH EXAM EST PT 1/>: CPT

## 2023-11-22 PROCEDURE — 92133 CPTRZD OPH DX IMG PST SGM ON: CPT

## 2023-11-28 PROBLEM — K21.9 ACID REFLUX: Status: ACTIVE | Noted: 2021-03-15

## 2023-11-29 ENCOUNTER — APPOINTMENT (OUTPATIENT)
Dept: CARDIOLOGY | Facility: CLINIC | Age: 68
End: 2023-11-29
Payer: MEDICARE

## 2023-11-29 ENCOUNTER — APPOINTMENT (OUTPATIENT)
Dept: CARDIOLOGY | Facility: CLINIC | Age: 68
End: 2023-11-29

## 2023-11-29 DIAGNOSIS — K21.9 GASTRO-ESOPHAGEAL REFLUX DISEASE W/OUT ESOPHAGITIS: ICD-10-CM

## 2023-11-29 LAB — HBA1C MFR BLD HPLC: 5.9

## 2023-11-29 PROCEDURE — 99215 OFFICE O/P EST HI 40 MIN: CPT

## 2023-11-29 RX ORDER — ROSUVASTATIN CALCIUM 20 MG/1
20 TABLET, FILM COATED ORAL
Qty: 90 | Refills: 1 | Status: ACTIVE | COMMUNITY
Start: 2023-11-29 | End: 1900-01-01

## 2023-11-30 PROBLEM — Z00.00 REGULAR CHECK-UP: Status: ACTIVE | Noted: 2021-03-15

## 2023-12-03 ENCOUNTER — NON-APPOINTMENT (OUTPATIENT)
Age: 68
End: 2023-12-03

## 2023-12-04 LAB
ANION GAP SERPL CALC-SCNC: 11 MMOL/L
BASOPHILS # BLD AUTO: 0.05 K/UL
BASOPHILS NFR BLD AUTO: 0.9 %
BUN SERPL-MCNC: 24 MG/DL
CALCIUM SERPL-MCNC: 9.4 MG/DL
CHLORIDE SERPL-SCNC: 102 MMOL/L
CHOLEST SERPL-MCNC: 174 MG/DL
CO2 SERPL-SCNC: 26 MMOL/L
CREAT SERPL-MCNC: 0.97 MG/DL
EGFR: 86 ML/MIN/1.73M2
EOSINOPHIL # BLD AUTO: 0.12 K/UL
EOSINOPHIL NFR BLD AUTO: 2.2 %
GLUCOSE SERPL-MCNC: 105 MG/DL
HCT VFR BLD CALC: 45.6 %
HDLC SERPL-MCNC: 61 MG/DL
HGB BLD-MCNC: 15 G/DL
IMM GRANULOCYTES NFR BLD AUTO: 0.4 %
LDLC SERPL CALC-MCNC: 103 MG/DL
LYMPHOCYTES # BLD AUTO: 1.7 K/UL
LYMPHOCYTES NFR BLD AUTO: 31.8 %
MAN DIFF?: NORMAL
MCHC RBC-ENTMCNC: 31.3 PG
MCHC RBC-ENTMCNC: 32.9 GM/DL
MCV RBC AUTO: 95 FL
MONOCYTES # BLD AUTO: 0.55 K/UL
MONOCYTES NFR BLD AUTO: 10.3 %
NEUTROPHILS # BLD AUTO: 2.9 K/UL
NEUTROPHILS NFR BLD AUTO: 54.4 %
NONHDLC SERPL-MCNC: 114 MG/DL
PLATELET # BLD AUTO: 132 K/UL
POTASSIUM SERPL-SCNC: 4.2 MMOL/L
RBC # BLD: 4.8 M/UL
RBC # FLD: 13.6 %
SODIUM SERPL-SCNC: 139 MMOL/L
TRIGL SERPL-MCNC: 54 MG/DL
WBC # FLD AUTO: 5.34 K/UL

## 2023-12-05 ENCOUNTER — NON-APPOINTMENT (OUTPATIENT)
Age: 68
End: 2023-12-05

## 2023-12-06 ENCOUNTER — NON-APPOINTMENT (OUTPATIENT)
Age: 68
End: 2023-12-06

## 2023-12-07 ENCOUNTER — APPOINTMENT (OUTPATIENT)
Dept: CARDIOLOGY | Facility: CLINIC | Age: 68
End: 2023-12-07
Payer: MEDICARE

## 2023-12-07 ENCOUNTER — NON-APPOINTMENT (OUTPATIENT)
Age: 68
End: 2023-12-07

## 2023-12-07 VITALS
HEART RATE: 63 BPM | WEIGHT: 174 LBS | DIASTOLIC BLOOD PRESSURE: 64 MMHG | HEIGHT: 72 IN | BODY MASS INDEX: 23.57 KG/M2 | SYSTOLIC BLOOD PRESSURE: 144 MMHG | OXYGEN SATURATION: 99 %

## 2023-12-07 PROCEDURE — 99205 OFFICE O/P NEW HI 60 MIN: CPT

## 2023-12-08 ENCOUNTER — APPOINTMENT (OUTPATIENT)
Dept: CARDIOLOGY | Facility: CLINIC | Age: 68
End: 2023-12-08
Payer: MEDICARE

## 2023-12-08 ENCOUNTER — APPOINTMENT (OUTPATIENT)
Dept: OPHTHALMOLOGY | Facility: CLINIC | Age: 68
End: 2023-12-08

## 2023-12-08 VITALS
DIASTOLIC BLOOD PRESSURE: 58 MMHG | WEIGHT: 174 LBS | SYSTOLIC BLOOD PRESSURE: 146 MMHG | OXYGEN SATURATION: 98 % | HEART RATE: 67 BPM | BODY MASS INDEX: 23.6 KG/M2

## 2023-12-08 DIAGNOSIS — M79.601 PAIN IN RIGHT ARM: ICD-10-CM

## 2023-12-08 PROCEDURE — 99214 OFFICE O/P EST MOD 30 MIN: CPT

## 2023-12-09 ENCOUNTER — RESULT REVIEW (OUTPATIENT)
Age: 68
End: 2023-12-09

## 2023-12-11 ENCOUNTER — APPOINTMENT (OUTPATIENT)
Dept: CARDIOLOGY | Facility: CLINIC | Age: 68
End: 2023-12-11
Payer: MEDICARE

## 2023-12-11 VITALS
HEIGHT: 72 IN | HEART RATE: 64 BPM | SYSTOLIC BLOOD PRESSURE: 132 MMHG | DIASTOLIC BLOOD PRESSURE: 78 MMHG | OXYGEN SATURATION: 97 % | BODY MASS INDEX: 23.3 KG/M2 | WEIGHT: 172 LBS

## 2023-12-11 PROCEDURE — 99214 OFFICE O/P EST MOD 30 MIN: CPT

## 2023-12-11 RX ORDER — CLOPIDOGREL 75 MG/1
75 TABLET, FILM COATED ORAL DAILY
Refills: 0 | Status: DISCONTINUED | COMMUNITY
End: 2023-12-11

## 2023-12-21 ENCOUNTER — NON-APPOINTMENT (OUTPATIENT)
Age: 68
End: 2023-12-21

## 2023-12-22 RX ORDER — CLOPIDOGREL BISULFATE 75 MG/1
75 TABLET, FILM COATED ORAL
Qty: 90 | Refills: 1 | Status: DISCONTINUED | COMMUNITY
Start: 2023-12-07 | End: 2023-12-22

## 2024-04-05 ENCOUNTER — APPOINTMENT (OUTPATIENT)
Dept: OPHTHALMOLOGY | Facility: CLINIC | Age: 69
End: 2024-04-05
Payer: SELF-PAY

## 2024-04-05 ENCOUNTER — NON-APPOINTMENT (OUTPATIENT)
Age: 69
End: 2024-04-05

## 2024-04-05 PROCEDURE — ZZZZZ: CPT

## 2024-04-05 PROCEDURE — 92015 DETERMINE REFRACTIVE STATE: CPT

## 2024-04-09 ENCOUNTER — APPOINTMENT (OUTPATIENT)
Dept: CT IMAGING | Facility: CLINIC | Age: 69
End: 2024-04-09
Payer: MEDICARE

## 2024-04-09 PROCEDURE — 74178 CT ABD&PLV WO CNTR FLWD CNTR: CPT

## 2024-04-15 NOTE — REASON FOR VISIT
[Other: ____] : [unfilled] [FreeTextEntry1] : Delma is a 69-year-old male with history of hypertension on lisinopril HCTZ, amlidipine, BPH on Tamsulosin, GERD, vertigo, family history of valvular heart disease.  No history of CAD, MI, revascularization, VHD, CHF, TIA, CVA, diabetes, PVD, DVT, PE, arrhythmia, AF.  Patient has palpitations brief rapid pulse at random times. Patient has dyspnea with moderate exertion. Cardiovascular review of symptoms is negative for exertional chest pain, dizziness or syncope.  No PND or orthopnea leg edema.  No bleeding or black stool.  No exercise routine.  Patient is walking 10 minutes on occasion.  Patient is a retired  at [employer] living locally with his partner. Currently working as local play .  Coronary CTA Nov 2023 high risk coronary calcium score 703, , , ramus 130, severe stenosis ramus, moderate proximal LAD, limited FFR for ramus.  Echocardiogram March 2023 LVEF 60%, mild MR and TR, normal RVSP.  Carotid and abdominal ultrasound March 2023, mild nonobstructive plaque, normal abdominal aortic size.    Echocardiogram March 2022 LVEF 60%, mild MR, trace pericardial effusion  Exercise stress echo May 2021, ischemic EKG response with normal exercise wall motion, leg fatigue with peak exercise, 89% MPHR, 7 minutes 45 seconds of Jose Roberto protocol, baseline sinus rhythm LAE, IRBBB, NSST  Echocardiogram March 2021, LVEF 60%, mild to moderate MR, mild TR, normal RVSP.  Carotid and abdominal ultrasound March 2021, mild nonobstructive plaque, normal abdominal aortic size.  Zio patch 1 week heart monitor March 2021 sinus rhythm average heart rate 62 bpm, brief PSVT, PACs, PVCs, ventricular bigeminy and trigeminy  EKG sinus bradycardia, NAVYA, anterior TWI  Patient followed by cardiologist Dr. Ward who has retired, last seen 2013 normal Myoview stress test, echocardiogram and Holter monitor at that time.   Labs Oct 2020 normal CBC, BMP, LFT, TSH, HbA1c 5.9 fasting cholesterol 189, triglyceride 50, HDL 64,   [Consultation] : a consultation regarding [FreeTextEntry2] : noninvasive testing for brief palpitations, chest pain, dyspnea at rest

## 2024-04-15 NOTE — DISCUSSION/SUMMARY
[FreeTextEntry1] : Patient has medical history detailed above and active medical issues including:  - Abnormal coronary CTA severe ramus stenosis, moderate LAD stenosis FFR limited on ramus, high risk coronary calcium score 703 referred for left heart catheterization.  Start aspirin 81 Mg daily, Crestor 20 Mg daily.  -  Palpitations, PSVT, Zio patch Sept 2023 PSVT  155-187 bpm lasting more than 1 hour.  Bradycardia on low-dose Toprol 12.5 Mg daily.  Consult with electrophysiologist for SVT ablation  - Dyspnea on exertion, multiple CAD risk factors. Coronary CTA and coronary calcium score ordered to assess for obstructive CAD and risk stratification.   - Hypertension, prior bradycardia improved off Toprol, average resting home BPs at guideline goal on lisinopril 40 Mg twice daily, HCTZ 12.5 Mg daily with follow-up home BPs. Repeat labs ordered  - Family history of valvular heart disease father had 3 AVR surgeries  - BPH on Tamsulosin, nephrologist Dr. Chaidez  Patient will be seen in cardiology follow-up after cardiac catheterization  Delma will follow up with Dr Herman Mcginnis for primary care.  Total time spent 45 minutes, reviewing of test results, chart information, patient discussion, physical exam and completion of chart documentation.

## 2024-04-15 NOTE — PHYSICAL EXAM
[Well Developed] : well developed [Well Nourished] : well nourished [No Acute Distress] : no acute distress [Normal Venous Pressure] : normal venous pressure [No Carotid Bruit] : no carotid bruit [Normal S1, S2] : normal S1, S2 [No Murmur] : no murmur [No Rub] : no rub [No Gallop] : no gallop [Murmur] : murmur [Clear Lung Fields] : clear lung fields [Good Air Entry] : good air entry [No Respiratory Distress] : no respiratory distress  [Soft] : abdomen soft [Non Tender] : non-tender [No Masses/organomegaly] : no masses/organomegaly [Normal Bowel Sounds] : normal bowel sounds [Normal Gait] : normal gait [No Edema] : no edema [No Cyanosis] : no cyanosis [No Clubbing] : no clubbing [No Varicosities] : no varicosities [No Rash] : no rash [No Skin Lesions] : no skin lesions [Moves all extremities] : moves all extremities [No Focal Deficits] : no focal deficits [Normal Speech] : normal speech [Alert and Oriented] : alert and oriented [Normal memory] : normal memory [de-identified] : 2/6 KHOI LSB of MR [General Appearance - Well Developed] : well developed [Normal Appearance] : normal appearance [Well Groomed] : well groomed [General Appearance - Well Nourished] : well nourished [No Deformities] : no deformities [General Appearance - In No Acute Distress] : no acute distress [Normal Conjunctiva] : the conjunctiva exhibited no abnormalities [Eyelids - No Xanthelasma] : the eyelids demonstrated no xanthelasmas [Normal Oral Mucosa] : normal oral mucosa [No Oral Pallor] : no oral pallor [No Oral Cyanosis] : no oral cyanosis [Normal Jugular Venous A Waves Present] : normal jugular venous A waves present [Normal Jugular Venous V Waves Present] : normal jugular venous V waves present [No Jugular Venous Ramírez A Waves] : no jugular venous ramírez A waves [Heart Rate And Rhythm] : heart rate and rhythm were normal [Heart Sounds] : normal S1 and S2 [Murmurs] : no murmurs present [Respiration, Rhythm And Depth] : normal respiratory rhythm and effort [Exaggerated Use Of Accessory Muscles For Inspiration] : no accessory muscle use [Auscultation Breath Sounds / Voice Sounds] : lungs were clear to auscultation bilaterally [Abdomen Soft] : soft [Abdomen Tenderness] : non-tender [Abdomen Mass (___ Cm)] : no abdominal mass palpated [Abnormal Walk] : normal gait [Gait - Sufficient For Exercise Testing] : the gait was sufficient for exercise testing [Nail Clubbing] : no clubbing of the fingernails [Cyanosis, Localized] : no localized cyanosis [Petechial Hemorrhages (___cm)] : no petechial hemorrhages [Skin Color & Pigmentation] : normal skin color and pigmentation [] : no rash [No Venous Stasis] : no venous stasis [Skin Lesions] : no skin lesions [No Skin Ulcers] : no skin ulcer [No Xanthoma] : no  xanthoma was observed [Oriented To Time, Place, And Person] : oriented to person, place, and time [Affect] : the affect was normal [Mood] : the mood was normal [No Anxiety] : not feeling anxious

## 2024-04-17 ENCOUNTER — APPOINTMENT (OUTPATIENT)
Dept: CARDIOLOGY | Facility: CLINIC | Age: 69
End: 2024-04-17

## 2024-04-22 ENCOUNTER — APPOINTMENT (OUTPATIENT)
Dept: CARDIOLOGY | Facility: CLINIC | Age: 69
End: 2024-04-22

## 2024-04-30 ENCOUNTER — APPOINTMENT (OUTPATIENT)
Dept: OPHTHALMOLOGY | Facility: CLINIC | Age: 69
End: 2024-04-30
Payer: MEDICARE

## 2024-04-30 ENCOUNTER — NON-APPOINTMENT (OUTPATIENT)
Age: 69
End: 2024-04-30

## 2024-04-30 PROCEDURE — 92083 EXTENDED VISUAL FIELD XM: CPT

## 2024-04-30 PROCEDURE — ZZZZZ: CPT

## 2024-04-30 PROCEDURE — 92014 COMPRE OPH EXAM EST PT 1/>: CPT

## 2024-05-01 PROBLEM — N40.0 BPH (BENIGN PROSTATIC HYPERPLASIA): Status: ACTIVE | Noted: 2021-03-15

## 2024-05-01 PROBLEM — R94.39 ABNORMAL CARDIOVASCULAR STRESS TEST: Status: ACTIVE | Noted: 2021-05-10

## 2024-05-01 PROBLEM — E78.5 BORDERLINE HYPERLIPIDEMIA: Status: ACTIVE | Noted: 2021-03-15

## 2024-05-02 ENCOUNTER — APPOINTMENT (OUTPATIENT)
Dept: CARDIOLOGY | Facility: CLINIC | Age: 69
End: 2024-05-02
Payer: MEDICARE

## 2024-05-02 VITALS
SYSTOLIC BLOOD PRESSURE: 138 MMHG | BODY MASS INDEX: 23.43 KG/M2 | WEIGHT: 173 LBS | HEART RATE: 63 BPM | OXYGEN SATURATION: 98 % | HEIGHT: 72 IN | DIASTOLIC BLOOD PRESSURE: 62 MMHG

## 2024-05-02 DIAGNOSIS — R94.39 ABNORMAL RESULT OF OTHER CARDIOVASCULAR FUNCTION STUDY: ICD-10-CM

## 2024-05-02 DIAGNOSIS — E78.5 HYPERLIPIDEMIA, UNSPECIFIED: ICD-10-CM

## 2024-05-02 DIAGNOSIS — N40.0 BENIGN PROSTATIC HYPERPLASIA WITHOUT LOWER URINARY TRACT SYMPMS: ICD-10-CM

## 2024-05-02 PROCEDURE — G2211 COMPLEX E/M VISIT ADD ON: CPT

## 2024-05-02 PROCEDURE — 93242 EXT ECG>48HR<7D RECORDING: CPT

## 2024-05-02 PROCEDURE — 99215 OFFICE O/P EST HI 40 MIN: CPT

## 2024-05-02 NOTE — REASON FOR VISIT
[Other: ____] : [unfilled] [Consultation] : a consultation regarding [FreeTextEntry1] : Delma is a 69-year-old male with history of hypertension on lisinopril HCTZ, amlidipine, BPH on Tamsulosin, GERD, vertigo, family history of valvular heart disease abnormal coronary CTA, TERNT ramus 12/5/23 on aspirin, prasugrel, palpitations, PSVT 180s.   No history of CAD, MI, revascularization, VHD, CHF, TIA, CVA, diabetes, PVD, DVT, PE, AF.  Patient has palpitations brief rapid pulse in setting of anxiety. Patient has dyspnea with moderate exertion. Cardiovascular review of symptoms is negative for exertional chest pain, dizziness or syncope.  No PND or orthopnea leg edema.  No bleeding or black stool.  No exercise routine.  Patient is walking 10 minutes on occasion.  Patient is a retired  at [employer] living locally with his partner.  Spends [place] in [place].  Currently working as local play .  Coronary CTA Nov 2023 high risk coronary calcium score 703, , , ramus 130, severe stenosis ramus, moderate proximal LAD, limited FFR for ramus.  Echocardiogram March 2023 LVEF 60%, mild MR and TR, normal RVSP.  Carotid and abdominal ultrasound March 2023, mild nonobstructive plaque, normal abdominal aortic size.    Echocardiogram March 2022 LVEF 60%, mild MR, trace pericardial effusion  Exercise stress echo May 2021, ischemic EKG response with normal exercise wall motion, leg fatigue with peak exercise, 89% MPHR, 7 minutes 45 seconds of Jose Roberto protocol, baseline sinus rhythm LAE, IRBBB, NSST  Echocardiogram March 2021, LVEF 60%, mild to moderate MR, mild TR, normal RVSP.  Carotid and abdominal ultrasound March 2021, mild nonobstructive plaque, normal abdominal aortic size.  Zio patch 1 week heart monitor March 2021 sinus rhythm average heart rate 62 bpm, brief PSVT, PACs, PVCs, ventricular bigeminy and trigeminy  EKG sinus bradycardia, NAVYA, anterior TWI  Patient followed by cardiologist Dr. Ward who has retired, last seen 2013 normal Myoview stress test, echocardiogram and Holter monitor at that time.   Labs Oct 2020 normal CBC, BMP, LFT, TSH, HbA1c 5.9 fasting cholesterol 189, triglyceride 50, HDL 64,   [FreeTextEntry2] : noninvasive testing for brief palpitations, chest pain, dyspnea at rest

## 2024-05-02 NOTE — DISCUSSION/SUMMARY
[FreeTextEntry1] : Patient has medical history detailed above and active medical issues including:  - Abnormal coronary CTA severe ramus stenosis, PCI TRENT RI 12/5/23 on aspirin and prasugrel.  Follow-up with Dr. Carrera to discuss discontinuation of prasugrel.   -  Palpitations, PSVT, Zio patch Sept 2023 PSVT  155-187 bpm lasting more than 1 hour.  Bradycardia on low-dose Toprol 12.5 Mg daily.  Follow-up with electrophysiologist Dr Salvador Smith for SVT ablation.  Zio patch 1 week heart monitor started today.  - Hypertension, prior bradycardia improved off Toprol, average resting home BPs at guideline goal on lisinopril 40 Mg twice daily, HCTZ 12.5 Mg daily with follow-up home BPs.   - Family history of valvular heart disease father had 3 AVR surgeries  - BPH on Tamsulosin, nephrologist Dr. Chaidez  Patient will be seen in cardiology follow-up 6 months  Nguyễn will follow up with Dr Herman Mcginnis for primary care.  Total time spent 45 minutes, reviewing of test results, chart information, patient discussion, physical exam and completion of chart documentation.

## 2024-05-02 NOTE — PHYSICAL EXAM
[Well Developed] : well developed [Well Nourished] : well nourished [No Acute Distress] : no acute distress [Normal Venous Pressure] : normal venous pressure [No Carotid Bruit] : no carotid bruit [Normal S1, S2] : normal S1, S2 [No Murmur] : no murmur [No Rub] : no rub [No Gallop] : no gallop [Murmur] : murmur [Clear Lung Fields] : clear lung fields [Good Air Entry] : good air entry [No Respiratory Distress] : no respiratory distress  [Soft] : abdomen soft [Non Tender] : non-tender [No Masses/organomegaly] : no masses/organomegaly [Normal Bowel Sounds] : normal bowel sounds [Normal Gait] : normal gait [No Edema] : no edema [No Cyanosis] : no cyanosis [No Clubbing] : no clubbing [No Varicosities] : no varicosities [No Rash] : no rash [No Skin Lesions] : no skin lesions [Moves all extremities] : moves all extremities [No Focal Deficits] : no focal deficits [Normal Speech] : normal speech [Alert and Oriented] : alert and oriented [Normal memory] : normal memory [General Appearance - Well Developed] : well developed [Normal Appearance] : normal appearance [Well Groomed] : well groomed [General Appearance - Well Nourished] : well nourished [No Deformities] : no deformities [General Appearance - In No Acute Distress] : no acute distress [Normal Conjunctiva] : the conjunctiva exhibited no abnormalities [Eyelids - No Xanthelasma] : the eyelids demonstrated no xanthelasmas [Normal Oral Mucosa] : normal oral mucosa [No Oral Pallor] : no oral pallor [No Oral Cyanosis] : no oral cyanosis [Normal Jugular Venous A Waves Present] : normal jugular venous A waves present [Normal Jugular Venous V Waves Present] : normal jugular venous V waves present [No Jugular Venous Ramírez A Waves] : no jugular venous ramírez A waves [Heart Rate And Rhythm] : heart rate and rhythm were normal [Heart Sounds] : normal S1 and S2 [Murmurs] : no murmurs present [Respiration, Rhythm And Depth] : normal respiratory rhythm and effort [Exaggerated Use Of Accessory Muscles For Inspiration] : no accessory muscle use [Auscultation Breath Sounds / Voice Sounds] : lungs were clear to auscultation bilaterally [Abdomen Soft] : soft [Abdomen Tenderness] : non-tender [Abdomen Mass (___ Cm)] : no abdominal mass palpated [Abnormal Walk] : normal gait [Gait - Sufficient For Exercise Testing] : the gait was sufficient for exercise testing [Nail Clubbing] : no clubbing of the fingernails [Cyanosis, Localized] : no localized cyanosis [Petechial Hemorrhages (___cm)] : no petechial hemorrhages [Skin Color & Pigmentation] : normal skin color and pigmentation [] : no rash [No Venous Stasis] : no venous stasis [Skin Lesions] : no skin lesions [No Skin Ulcers] : no skin ulcer [No Xanthoma] : no  xanthoma was observed [Oriented To Time, Place, And Person] : oriented to person, place, and time [Affect] : the affect was normal [Mood] : the mood was normal [No Anxiety] : not feeling anxious [de-identified] : 2/6 [facility] LSB of MR

## 2024-05-21 ENCOUNTER — APPOINTMENT (OUTPATIENT)
Dept: CARDIOLOGY | Facility: CLINIC | Age: 69
End: 2024-05-21
Payer: MEDICARE

## 2024-05-21 VITALS
HEART RATE: 63 BPM | DIASTOLIC BLOOD PRESSURE: 60 MMHG | SYSTOLIC BLOOD PRESSURE: 136 MMHG | HEIGHT: 72 IN | BODY MASS INDEX: 23.7 KG/M2 | OXYGEN SATURATION: 98 % | WEIGHT: 175 LBS

## 2024-05-21 DIAGNOSIS — Z78.9 OTHER SPECIFIED HEALTH STATUS: ICD-10-CM

## 2024-05-21 DIAGNOSIS — I48.3 TYPICAL ATRIAL FLUTTER: ICD-10-CM

## 2024-05-21 DIAGNOSIS — I47.10 SUPRAVENTRICULAR TACHYCARDIA, UNSPECIFIED: ICD-10-CM

## 2024-05-21 DIAGNOSIS — Z80.1 FAMILY HISTORY OF MALIGNANT NEOPLASM OF TRACHEA, BRONCHUS AND LUNG: ICD-10-CM

## 2024-05-21 DIAGNOSIS — R07.89 OTHER CHEST PAIN: ICD-10-CM

## 2024-05-21 DIAGNOSIS — Z82.49 FAMILY HISTORY OF ISCHEMIC HEART DISEASE AND OTHER DISEASES OF THE CIRCULATORY SYSTEM: ICD-10-CM

## 2024-05-21 DIAGNOSIS — R42 DIZZINESS AND GIDDINESS: ICD-10-CM

## 2024-05-21 DIAGNOSIS — R00.2 PALPITATIONS: ICD-10-CM

## 2024-05-21 PROCEDURE — 99214 OFFICE O/P EST MOD 30 MIN: CPT

## 2024-05-21 PROCEDURE — G2211 COMPLEX E/M VISIT ADD ON: CPT

## 2024-05-21 RX ORDER — METOPROLOL SUCCINATE 25 MG/1
25 TABLET, EXTENDED RELEASE ORAL
Qty: 45 | Refills: 1 | Status: ACTIVE | COMMUNITY
Start: 2023-10-17 | End: 1900-01-01

## 2024-05-21 RX ORDER — HYDROCHLOROTHIAZIDE 12.5 MG/1
12.5 TABLET ORAL DAILY
Qty: 90 | Refills: 1 | Status: ACTIVE | COMMUNITY
Start: 2022-05-06 | End: 1900-01-01

## 2024-05-24 PROBLEM — I48.3 TYPICAL ATRIAL FLUTTER: Status: ACTIVE | Noted: 2021-03-15

## 2024-05-24 PROBLEM — Z78.9 DOES NOT USE TOBACCO: Status: ACTIVE | Noted: 2021-03-15

## 2024-05-24 PROBLEM — Z80.1 FAMILY HISTORY OF LUNG CANCER: Status: ACTIVE | Noted: 2021-03-15

## 2024-05-24 PROBLEM — R07.89 CHEST PRESSURE: Status: ACTIVE | Noted: 2021-03-15

## 2024-05-24 PROBLEM — R00.2 INTERMITTENT PALPITATIONS: Status: ACTIVE | Noted: 2023-09-29

## 2024-05-24 PROBLEM — Z82.49 FAMILY HISTORY OF HYPERTENSION: Status: ACTIVE | Noted: 2021-03-15

## 2024-05-24 PROBLEM — R07.89 CHEST TIGHTNESS: Status: ACTIVE | Noted: 2021-03-15

## 2024-05-24 PROBLEM — I47.10 PAROXYSMAL SVT (SUPRAVENTRICULAR TACHYCARDIA): Status: ACTIVE | Noted: 2023-10-17

## 2024-05-24 PROBLEM — Z78.9 NON-SMOKER: Status: ACTIVE | Noted: 2021-03-15

## 2024-05-24 PROBLEM — Z82.49 FAMILY HISTORY OF CONGESTIVE HEART FAILURE: Status: ACTIVE | Noted: 2021-03-15

## 2024-05-24 PROBLEM — R42 LIGHT HEADEDNESS: Status: ACTIVE | Noted: 2021-03-15

## 2024-05-24 PROBLEM — Z78.9 CONSUMES ALCOHOL OCCASIONALLY: Status: ACTIVE | Noted: 2021-03-15

## 2024-05-24 NOTE — DISCUSSION/SUMMARY
[FreeTextEntry1] : 68-year-old male with medical history detailed above and active medical issues including: Abnormal coronary CTA severe ramus stenosis, moderate LAD stenosis FFR limited on ramus, high risk coronary calcium score 703 referred for left heart catheterization.  Start aspirin 81 Mg daily, Crestor 20 Mg daily.  Palpitations, PSVT, Zio patch Sept 2023 PSVT  155-187 bpm lasting more than 1 hour.  Bradycardia on low-dose Toprol 12.5 Mg daily.   Recent ZIO with atrial ectopy and some short runs of PSVT  Patient is not ready to have an ablation at this time. Agrees to have an RFA if the episodes increase in intensity, frequency or duration. Continue current regimen.  Follow-up before leaving for [place].    Total time spent 35 minutes, reviewing of test results, chart information, patient discussion, physical exam and completion of chart documentation.

## 2024-05-24 NOTE — PHYSICAL EXAM
[Well Developed] : well developed [Well Nourished] : well nourished [No Acute Distress] : no acute distress [Normal Venous Pressure] : normal venous pressure [No Carotid Bruit] : no carotid bruit [Normal S1, S2] : normal S1, S2 [No Murmur] : no murmur [No Rub] : no rub [No Gallop] : no gallop [Murmur] : murmur [Clear Lung Fields] : clear lung fields [Good Air Entry] : good air entry [No Respiratory Distress] : no respiratory distress  [Soft] : abdomen soft [Non Tender] : non-tender [No Masses/organomegaly] : no masses/organomegaly [Normal Bowel Sounds] : normal bowel sounds [Normal Gait] : normal gait [No Edema] : no edema [No Cyanosis] : no cyanosis [No Clubbing] : no clubbing [No Varicosities] : no varicosities [No Rash] : no rash [No Skin Lesions] : no skin lesions [Moves all extremities] : moves all extremities [No Focal Deficits] : no focal deficits [Normal Speech] : normal speech [Alert and Oriented] : alert and oriented [Normal memory] : normal memory [de-identified] : 2/6 KHOI LSB of MR [General Appearance - Well Developed] : well developed [Normal Appearance] : normal appearance [Well Groomed] : well groomed [General Appearance - Well Nourished] : well nourished [No Deformities] : no deformities [General Appearance - In No Acute Distress] : no acute distress [Normal Conjunctiva] : the conjunctiva exhibited no abnormalities [Eyelids - No Xanthelasma] : the eyelids demonstrated no xanthelasmas [Normal Oral Mucosa] : normal oral mucosa [No Oral Pallor] : no oral pallor [No Oral Cyanosis] : no oral cyanosis [Normal Jugular Venous A Waves Present] : normal jugular venous A waves present [Normal Jugular Venous V Waves Present] : normal jugular venous V waves present [No Jugular Venous Ramírez A Waves] : no jugular venous ramírez A waves [Heart Rate And Rhythm] : heart rate and rhythm were normal [Heart Sounds] : normal S1 and S2 [Murmurs] : no murmurs present [Respiration, Rhythm And Depth] : normal respiratory rhythm and effort [Exaggerated Use Of Accessory Muscles For Inspiration] : no accessory muscle use [Auscultation Breath Sounds / Voice Sounds] : lungs were clear to auscultation bilaterally [Abdomen Soft] : soft [Abdomen Tenderness] : non-tender [Abdomen Mass (___ Cm)] : no abdominal mass palpated [Abnormal Walk] : normal gait [Gait - Sufficient For Exercise Testing] : the gait was sufficient for exercise testing [Nail Clubbing] : no clubbing of the fingernails [Cyanosis, Localized] : no localized cyanosis [Petechial Hemorrhages (___cm)] : no petechial hemorrhages [Skin Color & Pigmentation] : normal skin color and pigmentation [] : no rash [No Venous Stasis] : no venous stasis [Skin Lesions] : no skin lesions [No Skin Ulcers] : no skin ulcer [No Xanthoma] : no  xanthoma was observed [Oriented To Time, Place, And Person] : oriented to person, place, and time [Affect] : the affect was normal [Mood] : the mood was normal [No Anxiety] : not feeling anxious

## 2024-05-24 NOTE — REASON FOR VISIT
[Arrhythmia/ECG Abnorrmalities] : arrhythmia/ECG abnormalities [FreeTextEntry1] : 68-year-old male with history of hypertension on lisinopril HCTZ, amlodipine, BPH on Tamsulosin, GERD, vertigo, family history of valvular heart disease. No history of CAD, MI, revascularization, VHD, CHF, TIA, CVA, diabetes, PVD, DVT, PE, arrhythmia, AF. Patient has palpitations brief rapid pulse at random times. Patient has dyspnea with moderate exertion. Cardiovascular review of symptoms is negative for exertional chest pain, dizziness or syncope.  No PND or orthopnea leg edema.  No bleeding or black stool. No exercise routine.  Patient is walking 10 minutes on occasion. Patient is a retired  at [employer] living locally with his partner. 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 Coronary CTA Nov 2023 high risk coronary calcium score 703, , , ramus 130, severe stenosis ramus, moderate proximal LAD, limited FFR for ramus. Echocardiogram March 2023 LVEF 60%, mild MR and TR, normal RVSP. Carotid and abdominal ultrasound March 2023, mild nonobstructive plaque, normal abdominal aortic size.   Echocardiogram March 2022 LVEF 60%, mild MR, trace pericardial effusion. Exercise stress echo May 2021, ischemic EKG response with normal exercise wall motion, leg fatigue with peak exercise, 89% MPHR, 7 minutes 45 seconds of Jose Roberto protocol, baseline sinus rhythm LAE, IRBBB, NSST Echocardiogram March 2021, LVEF 60%, mild to moderate MR, mild TR, normal RVSP. Carotid and abdominal ultrasound March 2021, mild nonobstructive plaque, normal abdominal aortic size. Zio patch 1 week heart monitor March 2021 sinus rhythm average heart rate 62 bpm, brief PSVT, PACs, PVCs, ventricular bigeminy and trigeminy EKG sinus bradycardia, NAVYA, anterior TWI Patient followed by cardiologist Dr. Ward who has retired, last seen 2013 normal Rodrigue view stress test, echocardiogram and Holter monitor at that time.  Labs Oct 2020 normal CBC, BMP, LFT, TSH, HbA1c 5.9 fasting cholesterol 189, triglyceride 50, HDL 64,   Patient referred for an EP assessment for palpitations.  Interval history (05/21/2024) - The patient feels very improved after taking metoprolol. Only one episode concerned him while in [place]. Otherwise, no other interval issues.   [Consultation] : a consultation regarding [FreeTextEntry2] : noninvasive testing for brief palpitations, chest pain, dyspnea at rest

## 2024-05-28 PROCEDURE — 93244 EXT ECG>48HR<7D REV&INTERPJ: CPT

## 2024-06-05 ENCOUNTER — APPOINTMENT (OUTPATIENT)
Dept: CARDIOLOGY | Facility: CLINIC | Age: 69
End: 2024-06-05
Payer: MEDICARE

## 2024-06-05 VITALS
BODY MASS INDEX: 24.11 KG/M2 | HEART RATE: 59 BPM | HEIGHT: 72 IN | SYSTOLIC BLOOD PRESSURE: 132 MMHG | OXYGEN SATURATION: 97 % | WEIGHT: 178 LBS | DIASTOLIC BLOOD PRESSURE: 64 MMHG

## 2024-06-05 DIAGNOSIS — Q24.5 MALFORMATION OF CORONARY VESSELS: ICD-10-CM

## 2024-06-05 DIAGNOSIS — I25.10 ATHEROSCLEROTIC HEART DISEASE OF NATIVE CORONARY ARTERY W/OUT ANGINA PECTORIS: ICD-10-CM

## 2024-06-05 DIAGNOSIS — I10 ESSENTIAL (PRIMARY) HYPERTENSION: ICD-10-CM

## 2024-06-05 PROCEDURE — 99214 OFFICE O/P EST MOD 30 MIN: CPT

## 2024-06-05 RX ORDER — CLOPIDOGREL BISULFATE 75 MG/1
75 TABLET, FILM COATED ORAL
Qty: 90 | Refills: 3 | Status: ACTIVE | COMMUNITY
Start: 2024-06-05 | End: 1900-01-01

## 2024-06-05 RX ORDER — PRASUGREL 10 MG/1
10 TABLET, FILM COATED ORAL
Qty: 96 | Refills: 2 | Status: DISCONTINUED | COMMUNITY
Start: 2023-12-22 | End: 2024-06-05

## 2024-06-05 NOTE — PHYSICAL EXAM
[Normal] : soft, non-tender, no masses/organomegaly, normal bowel sounds [No Rash] : no rash [Moves all extremities] : moves all extremities [Alert and Oriented] : alert and oriented [de-identified] : right radial pulse 2+, good cap refill, warm, no bruit over right elbow, bruising around wrist.  Swelling at medial elbow.

## 2024-06-05 NOTE — HISTORY OF PRESENT ILLNESS
[FreeTextEntry1] : 68 yo M with CAD s/p PCI to prox sarah on 12/2023 residual rPDA lesion. This was done in setting of high risk ccta not for ACS. He has been on aspirin/effient. He has some side effects to medications prior.  He is >6 months out from stenting and it was a single short stent. He also had large volume hematuria and Plt 112. recent labwork LDL 65, a1c 6.1. plt 112.

## 2024-06-05 NOTE — REASON FOR VISIT
[Symptom and Test Evaluation] : symptom and test evaluation [CV Risk Factors and Non-Cardiac Disease] : CV risk factors and non-cardiac disease [Coronary Artery Disease] : coronary artery disease [FreeTextEntry3] : Dr. Chon King

## 2024-06-05 NOTE — DISCUSSION/SUMMARY
[FreeTextEntry1] : 70 yo M with CAD s/p PCI to vilma smith on 12/2023 residual rPDA lesion. This was done in setting of high risk ccta not for ACS. He has been on aspirin/effient. He has some side effects to medications prior.  He is >6 months out from stenting and it was a single short stent. He also had large volume hematuria and Plt 112.   - I am comfortable with monotherapy of antiplatelet. I prefer clopidogrel monotherapy. So he will trial this and if not tolerable, then will do aspirin 81 monotherapy. Medically manage the rpda lesion.  Follow with EP and Dr. Valentino routinely. Maintain other med therapy.

## 2024-07-20 ENCOUNTER — RX RENEWAL (OUTPATIENT)
Age: 69
End: 2024-07-20

## 2024-08-14 RX ORDER — HYDROCHLOROTHIAZIDE 12.5 MG/1
12.5 CAPSULE ORAL DAILY
Qty: 90 | Refills: 0 | Status: ACTIVE | COMMUNITY
Start: 2024-08-14 | End: 1900-01-01

## 2024-10-15 ENCOUNTER — APPOINTMENT (OUTPATIENT)
Dept: CARDIOLOGY | Facility: CLINIC | Age: 69
End: 2024-10-15
Payer: MEDICARE

## 2024-10-15 VITALS
OXYGEN SATURATION: 97 % | DIASTOLIC BLOOD PRESSURE: 60 MMHG | WEIGHT: 172 LBS | HEART RATE: 57 BPM | BODY MASS INDEX: 23.3 KG/M2 | SYSTOLIC BLOOD PRESSURE: 134 MMHG | HEIGHT: 72 IN

## 2024-10-15 DIAGNOSIS — I10 ESSENTIAL (PRIMARY) HYPERTENSION: ICD-10-CM

## 2024-10-15 DIAGNOSIS — Z80.1 FAMILY HISTORY OF MALIGNANT NEOPLASM OF TRACHEA, BRONCHUS AND LUNG: ICD-10-CM

## 2024-10-15 DIAGNOSIS — Z78.9 OTHER SPECIFIED HEALTH STATUS: ICD-10-CM

## 2024-10-15 DIAGNOSIS — Q24.5 MALFORMATION OF CORONARY VESSELS: ICD-10-CM

## 2024-10-15 DIAGNOSIS — Z82.49 FAMILY HISTORY OF ISCHEMIC HEART DISEASE AND OTHER DISEASES OF THE CIRCULATORY SYSTEM: ICD-10-CM

## 2024-10-15 DIAGNOSIS — I25.10 ATHEROSCLEROTIC HEART DISEASE OF NATIVE CORONARY ARTERY W/OUT ANGINA PECTORIS: ICD-10-CM

## 2024-10-15 DIAGNOSIS — I47.10 SUPRAVENTRICULAR TACHYCARDIA, UNSPECIFIED: ICD-10-CM

## 2024-10-15 DIAGNOSIS — R07.89 OTHER CHEST PAIN: ICD-10-CM

## 2024-10-15 DIAGNOSIS — R42 DIZZINESS AND GIDDINESS: ICD-10-CM

## 2024-10-15 LAB — HBA1C MFR BLD HPLC: 6.1

## 2024-10-15 PROCEDURE — 99214 OFFICE O/P EST MOD 30 MIN: CPT

## 2024-10-15 PROCEDURE — G2211 COMPLEX E/M VISIT ADD ON: CPT

## 2024-10-15 RX ORDER — DICYCLOMINE HYDROCHLORIDE 20 MG/1
20 TABLET ORAL
Refills: 0 | Status: ACTIVE | COMMUNITY

## 2024-11-04 ENCOUNTER — NON-APPOINTMENT (OUTPATIENT)
Age: 69
End: 2024-11-04

## 2024-11-04 ENCOUNTER — APPOINTMENT (OUTPATIENT)
Dept: OPHTHALMOLOGY | Facility: CLINIC | Age: 69
End: 2024-11-04
Payer: MEDICARE

## 2024-11-04 PROCEDURE — 92014 COMPRE OPH EXAM EST PT 1/>: CPT

## 2024-11-04 PROCEDURE — 92133 CPTRZD OPH DX IMG PST SGM ON: CPT

## 2024-11-07 ENCOUNTER — APPOINTMENT (OUTPATIENT)
Dept: CARDIOLOGY | Facility: CLINIC | Age: 69
End: 2024-11-07
Payer: MEDICARE

## 2024-11-07 PROCEDURE — 93306 TTE W/DOPPLER COMPLETE: CPT

## 2024-11-14 ENCOUNTER — APPOINTMENT (OUTPATIENT)
Dept: CARDIOLOGY | Facility: CLINIC | Age: 69
End: 2024-11-14
Payer: MEDICARE

## 2024-11-14 VITALS
DIASTOLIC BLOOD PRESSURE: 64 MMHG | SYSTOLIC BLOOD PRESSURE: 122 MMHG | WEIGHT: 178 LBS | OXYGEN SATURATION: 99 % | HEIGHT: 72 IN | HEART RATE: 58 BPM | BODY MASS INDEX: 24.11 KG/M2

## 2024-11-14 DIAGNOSIS — R07.89 OTHER CHEST PAIN: ICD-10-CM

## 2024-11-14 DIAGNOSIS — I25.10 ATHEROSCLEROTIC HEART DISEASE OF NATIVE CORONARY ARTERY W/OUT ANGINA PECTORIS: ICD-10-CM

## 2024-11-14 DIAGNOSIS — I10 ESSENTIAL (PRIMARY) HYPERTENSION: ICD-10-CM

## 2024-11-14 DIAGNOSIS — E78.5 HYPERLIPIDEMIA, UNSPECIFIED: ICD-10-CM

## 2024-11-14 DIAGNOSIS — N40.0 BENIGN PROSTATIC HYPERPLASIA WITHOUT LOWER URINARY TRACT SYMPMS: ICD-10-CM

## 2024-11-14 DIAGNOSIS — R42 DIZZINESS AND GIDDINESS: ICD-10-CM

## 2024-11-14 DIAGNOSIS — R00.2 PALPITATIONS: ICD-10-CM

## 2024-11-14 PROCEDURE — 99215 OFFICE O/P EST HI 40 MIN: CPT

## 2024-11-14 PROCEDURE — G2211 COMPLEX E/M VISIT ADD ON: CPT

## 2024-11-14 RX ORDER — SILDENAFIL 25 MG/1
25 TABLET ORAL
Refills: 0 | Status: ACTIVE | COMMUNITY

## 2025-02-12 DIAGNOSIS — I25.10 ATHEROSCLEROTIC HEART DISEASE OF NATIVE CORONARY ARTERY W/OUT ANGINA PECTORIS: ICD-10-CM

## 2025-05-05 ENCOUNTER — NON-APPOINTMENT (OUTPATIENT)
Age: 70
End: 2025-05-05

## 2025-05-05 ENCOUNTER — APPOINTMENT (OUTPATIENT)
Dept: OPHTHALMOLOGY | Facility: CLINIC | Age: 70
End: 2025-05-05
Payer: MEDICARE

## 2025-05-05 PROCEDURE — 92012 INTRM OPH EXAM EST PATIENT: CPT

## 2025-05-05 PROCEDURE — 92083 EXTENDED VISUAL FIELD XM: CPT

## 2025-05-17 ENCOUNTER — NON-APPOINTMENT (OUTPATIENT)
Age: 70
End: 2025-05-17

## 2025-05-19 ENCOUNTER — APPOINTMENT (OUTPATIENT)
Dept: CARDIOLOGY | Facility: CLINIC | Age: 70
End: 2025-05-19
Payer: MEDICARE

## 2025-05-19 VITALS
OXYGEN SATURATION: 98 % | SYSTOLIC BLOOD PRESSURE: 128 MMHG | HEIGHT: 72 IN | DIASTOLIC BLOOD PRESSURE: 60 MMHG | HEART RATE: 55 BPM | WEIGHT: 175 LBS | BODY MASS INDEX: 23.7 KG/M2

## 2025-05-19 DIAGNOSIS — I48.3 TYPICAL ATRIAL FLUTTER: ICD-10-CM

## 2025-05-19 DIAGNOSIS — R94.39 ABNORMAL RESULT OF OTHER CARDIOVASCULAR FUNCTION STUDY: ICD-10-CM

## 2025-05-19 DIAGNOSIS — I25.10 ATHEROSCLEROTIC HEART DISEASE OF NATIVE CORONARY ARTERY W/OUT ANGINA PECTORIS: ICD-10-CM

## 2025-05-19 DIAGNOSIS — R42 DIZZINESS AND GIDDINESS: ICD-10-CM

## 2025-05-19 DIAGNOSIS — N40.0 BENIGN PROSTATIC HYPERPLASIA WITHOUT LOWER URINARY TRACT SYMPMS: ICD-10-CM

## 2025-05-19 DIAGNOSIS — R07.89 OTHER CHEST PAIN: ICD-10-CM

## 2025-05-19 DIAGNOSIS — Q24.5 MALFORMATION OF CORONARY VESSELS: ICD-10-CM

## 2025-05-19 PROCEDURE — 99215 OFFICE O/P EST HI 40 MIN: CPT
